# Patient Record
(demographics unavailable — no encounter records)

---

## 2017-10-20 NOTE — ED
Terra WALSH Alfonso, scribed for Sarahy Guerrero MD on 10/20/17 at 1228 .





Back Pain





- HPI Summary


HPI Summary: 


 This patient is a 43 year old F BIBA to [facility] with a chief complaint of 

constant acute on chronic shooting neck, back, and leg pain worse since 

getting up today. She states my legs gave out and I fell onto the couch. She 

was taking klonopin 1 mg TID, Percocet 10 mg QID, gabapentin 400 mg BID, and 

oxycodone 10 mg BID and recently completed these prescriptions. The patient 

rates the pain 10/10 in severity. Symptoms alleviated by nothing. Patient 

reports cough, and bilateral LE numbness. Patient denies recent falls, recent 

trauma, fever, and chills. She reports her significant other in [place] committed 

suicide 3 weeks ago.





- History of Current Complaint


Chief Complaint: EDBackInjuryPain


Stated Complaint: MHE/BACK PAIN


Time Seen by Provider: 10/20/17 11:52


Hx Obtained From: Patient


Onset/Duration: Gradual Onset, Still Present, Worse Since - getting up today.

, Other - acute on chronic


Onset/Duration: Still Present


Timing: Constant


Back Pain Location: Is Discrete @ - neck, back, and leg pain


Severity Currently: Severe


Pain Intensity: 10


Pain Scale Used: 0-10 Numeric


Character: Sharp - shooting


Alleviating Symptom(s): Nothing


Associated Signs And Symptoms: Positive: Other - Patient reports cough, and 

bilateral LE numbness. Patient denies recent falls, recent trauma, fever, and 

chills.





- Allergies/Home Medications


Allergies/Adverse Reactions: 


 Allergies











Allergy/AdvReac Type Severity Reaction Status Date / Time


 


Pregabalin [From Lyrica] Allergy  Anaphylatic Verified 10/20/17 11:59





   Shock  











Home Medications: 


 Home Medications





Albuterol HFA INHALER* [Ventolin HFA Inhaler*] 2 puff INH Q4H PRN 10/20/17 [

History Confirmed 10/20/17]


Clonazepam [Klonopin] 1 mg PO TID 10/20/17 [History Confirmed 10/20/17]


DULoxetine DR CAP* [Cymbalta CAP*] 60 mg PO DAILY 10/20/17 [History Confirmed 10

/20/17]


Folic Acid TAB* [Folvite TAB*] 1 mg PO DAILY 10/20/17 [History Confirmed 10/20/

17]


Gabapentin CAP(*) [Neurontin 400 mg CAP(*)] 400 mg PO TID 10/20/17 [History 

Confirmed 10/20/17]


Magnesium 500 mg PO DAILY 10/20/17 [History Confirmed 10/20/17]


Multiple Vitamins W/ Minerals [Multivitamin Adults] 1 tab PO DAILY 10/20/17 [

History Confirmed 10/20/17]


Thiamine HCl [Thiamine] 100 mg PO DAILY 10/20/17 [History Confirmed 10/20/17]


Thiamine HCl [Vitamin B-1] 100 mg PO DAILY 10/20/17 [History Confirmed 10/20/17]


hydrOXYzine HCL TAB* [Atarax 25 MG TAB*] 25 mg PO BID 10/20/17 [History 

Confirmed 10/20/17]


oxyCODONE SR TAB(*) [Oxycontin 10 mg (*)] 10 mg PO Q12H PRN 10/20/17 [History 

Confirmed 10/20/17]


oxyCODONE TAB* [Roxycodone TAB 5 mg*] 10 mg PO Q4H PRN 10/20/17 [History 

Confirmed 10/20/17]


traZODone TAB* [Desyrel TAB*] 100 mg PO BEDTIME 10/20/17 [History Confirmed 10/

20/17]











PMH/Surg Hx/FS Hx/Imm Hx


Endocrine/Hematology History: 


   Denies: Hx Diabetes


Cardiovascular History: Reports: Hx Hypertension - TAKES MEDICATION


   Denies: Hx Pacemaker/ICD


Musculoskeletal History: Reports: Hx Back Problems


Sensory History: 


   Denies: Hx Hearing Aid


Psychiatric History: Reports: Hx Panic Disorder - ANXIETY





- Surgical History


Surgery Procedure, Year, and Place:  & LSP SURGERY X2.  


Infectious Disease History: No


Infectious Disease History: 


   Denies: Traveled Outside the [place] in Last 30 Days





- Family History


Known Family History: 


   Negative: Diabetes





- Social History


Occupation: Unemployed, Disabled


Lives: With Family - Sister


Alcohol Use: Occasionally


Alcohol Amount: "once a month", hx of alcohol abuse


Substance Use Type: Reports: None


Smoking Status (MU): Heavy Every Day Tobacco Smoker





Review of Systems


Negative: Fever, Chills


Positive: Cough


Positive: Other - constant acute on chronic shooting neck, back, and leg pain; 

negative recent falls, recent trauma


Neurological: Other - bilateral LE numbness


All Other Systems Reviewed And Are Negative: Yes





Physical Exam





- Summary


Physical Exam Summary: 





General: Well appearing, no pain distress.


Skin: Warm, Skin Color Reflects Adequate Perfusion, Dry


Eyes: EOMI, JEN


ENT: Pharynx normal, TMs normal


Neck: Supple, nontender


Respiratory: CTA, breath sounds present, no rhonchi, no wheezes, no rales


Cardiovascular: RRR, no murmur, no rub, no gallop


Abdomen: Soft, nontender, Non-distended, no guarding, no rebound


Bowel: Present


Musculoskeletal: PHYLLIS. No edema. Diffuse pain at baseline and increased pain in 

left and right thigh. Normal DTR. Normal UE strength. 4/5 strength at hip 

flexors which she explains is her normal.


Neuro: Sensory/motor intact at baseline. Cannot do dorsal flexion of both toes 

which she explains is her baseline. A&Ox3, CN intact 2-12


Psych: Affect/mood appropriate





Triage Information Reviewed: Yes


Vital Signs On Initial Exam: 


 Initial Vitals











Temp Pulse Resp BP Pulse Ox


 


 99.1 F   87   22   157/97   97 


 


 10/20/17 11:54  10/20/17 11:54  10/20/17 11:54  10/20/17 11:54  10/20/17 11:54











Vital Signs Reviewed: Yes





- Desi Coma Scale


Coma Scale Total: 15





Diagnostics





- Vital Signs


 Vital Signs











  Temp Pulse Resp BP Pulse Ox


 


 10/20/17 12:18    18  


 


 10/20/17 11:54  99.1 F  87  22  157/97  97














- Laboratory


Lab Statement: Any lab studies that have been ordered have been reviewed, and 

results considered in the medical decision making process.





Back Pain Course/Dx





- Course


Course Of Treatment: this is a 44 yo female who reports she is new to the area 

with extensive history of lumbar, and cervical neck pain with surgeries that 

she says she needs more surgery. She moved from [place] after her boyfriend 

committed suicide where she reports she was on long term pain meds. She has 

asked that I refill her pain meds while she attempts to get into an md here. 

She denies any opiate disorder history. her exam shows no trauma (and pt denies 

any trauma). She has diffuse spinal tenderness with normal dtr's throughout. 

she deneis ivda, or diabetes or cancer hx, her strength and sensation in her 

upper exts is normal. In her lower ext she reports she has long standing 

neuropathy with decreased sensation and that she walks with a cane because of 

this. She is unable to dorsiflex both great toes and has decreased hip flexion 

but reports this is her norm





- Diagnoses


Provider Diagnoses: 


 Chronic pain








Discharge





- Discharge Plan


Condition: Stable


Disposition: HOME


Prescriptions: 


Gabapentin CAP(*) [Neurontin 300 CAP(*)] 400 mg PO BID #6 cap


Oxycodone TAB(NF) [Oxycodone HCl 10 MG] 10 mg PO BID PRN #6 tab MDD 2


 PRN Reason: Pain


clonazePAM TAB(*) [KlonoPIN TAB(*)] 1 mg PO TID PRN #9 tab MDD 3


 PRN Reason: Pain


oxyCODONE/Acetamin 10/325(NF) [Percocet 10/325 (NF)] 1 tab PO Q6HR PRN #9 tab 

MDD 4


 PRN Reason: Pain


Patient Education Materials:  Chronic Pain (ED)


Referrals: 


Rosita Silva MD [Primary Care Provider] - 3 Days


Additional Instructions: 


RETURN TO THE EMERGENCY DEPARTMENT FOR CHANGING OR WORSENING SYMPTOMS.





The documentation as recorded by the Terra jolly Alfonso accurately reflects 

the service I personally performed and the decisions made by me, Sarahy Guerrero MD.

## 2017-11-13 NOTE — ED
Alison AWLSH Edward, scribed for Patsy Kaplan MD on 17 at 0251 .





Psychiatric Complaint





- HPI Summary


HPI Summary: 





45 y/o female BIBA c/o depression and anxiety. Pt's children called the 

ambulance stating that the pt was hallucinating; however, the pt denies 

hallucinations. The symptoms are not aggravated or alleviated by anything. Pt 

recently moved from [place]. PMHx anxiety, ADD, depression, PTSD. Sx 2 

neck surgeries. Associated sx: back pain. 





- History Of Current Complaint


Hx Obtained From: Patient


Onset/Duration: Still Present


Character: Depressed, Anxious


Aggravating Factor(s): Nothing


Alleviating Factor(s): Nothing


Associated Signs And Symptoms: Positive: Hallucinating





- Allergies/Home Medications


Allergies/Adverse Reactions: 


 Allergies











Allergy/AdvReac Type Severity Reaction Status Date / Time


 


Pregabalin [From Lyrica] Allergy  Anaphylatic Verified 10/20/17 11:59





   Shock  














PMH/Surg Hx/FS Hx/Imm Hx


Previously Healthy: No


Endocrine/Hematology History: 


   Denies: Hx Diabetes


Cardiovascular History: Reports: Hx Hypertension - TAKES MEDICATION


   Denies: Hx Pacemaker/ICD


Musculoskeletal History: Reports: Hx Back Problems


Sensory History: 


   Denies: Hx Hearing Aid


Psychiatric History: Reports: Hx Panic Disorder - ANXIETY





- Surgical History


Surgery Procedure, Year, and Place:  & LSP SURGERY X2.  





- Family History


Known Family History: 


   Negative: Diabetes





- Social History


Alcohol Use: Occasionally


Alcohol Amount: "once a month", hx of alcohol abuse


Hx Substance Use: No


Substance Use Type: Reports: None


Hx Tobacco Use: Yes


Smoking Status (MU): Heavy Every Day Tobacco Smoker





Review of Systems


Constitutional: Negative


Eyes: Negative


ENT: Negative


Cardiovascular: Negative


Respiratory: Negative


Gastrointestinal: Negative


Genitourinary: Negative


Positive: Arthralgia - Back pain


Skin: Negative


Neurological: Negative


Positive: Anxious, Depressed


All Other Systems Reviewed And Are Negative: No





Physical Exam





- Summary


Physical Exam Summary: 





Appearance: Alert, conversive, nontoxic appearing, tearful.


Skin: Warm, dry, no mottling, no rashes, no contusions


HEENT: EOMI, PERRL, moist mucous membranes


Neck: No masses on the neck, supple


Respiratory: Clear to auscultation, breath sounds present, no rales, no rhonchi

, no wheezes


Cardiovascular: RRR, pulses are symmetrical in both lower and upper extremities


Abdomen: Soft, non-tender


Bowel Sounds: Present


Musculoskeletal: No CVA tenderness, no obvious deformity, moving all 

extremities in a grossly normal manner


Neurological: A&Ox3, CN II-XII Intact, moving all extremities symmetrically


Psychiatric: Tearful, anxious, difficulty making eye contact. Poor insight and 

poor judgment.


Triage Information Reviewed: Yes


Vital Signs On Initial Exam: 


 Initial Vitals











Temp Pulse Resp BP Pulse Ox


 


 99.2 F   105   20   132/86   99 


 


 17 02:58  17 02:58  17 02:58  17 02:58  17 02:58











Vital Signs Reviewed: Yes





Diagnostics





- Vital Signs


 Vital Signs











  Temp Pulse Resp BP Pulse Ox


 


 17 04:10    18  


 


 17 02:58  99.2 F  105  20  132/86  99














- Laboratory


Lab Results: 


 Lab Results











  17 Range/Units





  06:39 


 


WBC  11.0 H  (3.5-10.8)  10^3/ul


 


RBC  4.40  (4.0-5.4)  10^6/ul


 


Hgb  12.1  (12.0-16.0)  g/dl


 


Hct  37  (35-47)  %


 


MCV  83  (80-97)  fL


 


MCH  28  (27-31)  pg


 


MCHC  33  (31-36)  g/dl


 


RDW  20 H  (10.5-15)  %


 


Plt Count  236  (150-450)  10^3/ul


 


MPV  8  (7.4-10.4)  um3


 


Neut % (Auto)  64.3  (38-83)  %


 


Lymph % (Auto)  26.2  (25-47)  %


 


Mono % (Auto)  8.3  (1-9)  %


 


Eos % (Auto)  0.2  (0-6)  %


 


Baso % (Auto)  1.0  (0-2)  %


 


Absolute Neuts (auto)  7.1  (1.5-7.7)  10^3/ul


 


Absolute Lymphs (auto)  2.9  (1.0-4.8)  10^3/ul


 


Absolute Monos (auto)  0.9 H  (0-0.8)  10^3/ul


 


Absolute Eos (auto)  0  (0-0.6)  10^3/ul


 


Absolute Basos (auto)  0.1  (0-0.2)  10^3/ul


 


Absolute Nucleated RBC  0  10^3/ul


 


Nucleated RBC %  0  











Result Diagrams: 


 17 06:39





Lab Statement: Any lab studies that have been ordered have been reviewed, and 

results considered in the medical decision making process.





Course/Dx





- Differential Dx/Clinical Impression


Provider Diagnosis: 


 Anxiety








Discharge





- Discharge Plan


Condition: Stable


Disposition: OTHER


Discharge Disposition Comment: signed out to Dr. Durham


Referrals: 


Rosita Silva MD [Primary Care Provider] - 





The documentation as recorded by the Alison jolly Edward accurately reflects the 

service I personally performed and the decisions made by Eusebio hernandez Norma, MD.

## 2017-11-13 NOTE — ED
Jesús WALSH Angela, scribed for Timothy Durham MD on 11/13/17 at 1548 .





Progress





- Progress Note


Progress Note: 





This pt was signed out by Dr. Kaplan, pending disposition, awaiting MHE.





Pt was seen and evaluated by Dr. Simeon, he recommends discharge. Pt will be 

discharged with outpatient follow up from counseling and social work.





Pt will be discharged to home in stable condition with a diagnosis of substance 

abuse and depression. 





Course/Dx





- Diagnoses


Provider Diagnoses: 


 Substance abuse, Depression








The documentation as recorded by the Jesús jolly Angela accurately reflects 

the service I personally performed and the decisions made by me, Timothy Durham MD.

## 2017-11-24 NOTE — ED
Psychiatric Complaint





- HPI Summary


HPI Summary: 


44F presents with increasing depression.   She has history of chronic pain and 

weakness that is unchanged.  She just moved from [place].  She has history of 

chronic pain that takes pain medication for but has ran out and does not have a 

primary here.  She is here but she wants to get linked with someone from mental 

health.  She states has history of PTSD and depression.  Her boyfriend 

committed suicide in [place].  She denies any si/hi.








- History Of Current Complaint


Chief Complaint: EDMentalHealth


Time Seen by Provider: 17 21:13





- Allergies/Home Medications


Allergies/Adverse Reactions: 


 Allergies











Allergy/AdvReac Type Severity Reaction Status Date / Time


 


Pregabalin [From Lyrica] Allergy  Anaphylatic Verified 10/20/17 11:59





   Shock  














PMH/Surg Hx/FS Hx/Imm Hx


Endocrine/Hematology History: 


   Denies: Hx Diabetes


Cardiovascular History: Reports: Hx Hypertension - TAKES MEDICATION


   Denies: Hx Pacemaker/ICD


Musculoskeletal History: Reports: Hx Back Problems


Sensory History: 


   Denies: Hx Hearing Aid


Psychiatric History: Reports: Hx Panic Disorder - ANXIETY


   Denies: Hx of Violent Episodes Against Others





- Surgical History


Surgery Procedure, Year, and Place:  & LSP SURGERY X2.  





- Immunization History


Date of Tetanus Vaccine: utd


Date of Influenza Vaccine: none


Infectious Disease History: No


Infectious Disease History: 


   Denies: Traveled Outside the [place] in Last 30 Days





- Family History


Known Family History: 


   Negative: Diabetes





- Social History


Alcohol Use: Weekly


Alcohol Amount: 1/5 a week


Hx Substance Use: No


Substance Use Type: Reports: Marijuana


Substance Use Comment - Amount & Last Used: used a couple weeks ago


Hx Tobacco Use: Yes


Smoking Status (MU): Heavy Every Day Tobacco Smoker





Review of Systems


Negative: Fever


Negative: Chest Pain


Negative: Shortness Of Breath


Positive: Weakness


Positive: Depressed


All Other Systems Reviewed And Are Negative: Yes





Physical Exam


Triage Information Reviewed: Yes


Vital Signs On Initial Exam: 


 Initial Vitals











Temp Pulse Resp BP Pulse Ox


 


 97 F   100   16   92/74   98 


 


 17 19:33  17 19:33  17 19:33  17 19:33  17 19:33











Vital Signs Reviewed: Yes


Appearance: Positive: Well-Appearing


Skin: Positive: Warm, Dry


Head/Face: Positive: Normal Head/Face Inspection


Eyes: Positive: Normal, EOMI, Conjunctiva Clear


ENT: Positive: Normal ENT inspection, Pharynx normal, TMs normal


Respiratory/Lung Sounds: Positive: Clear to Auscultation, Breath Sounds Present


Cardiovascular: Positive: Normal, RRR


Abdomen Description: Positive: Nontender, Soft


Bowel Sounds: Positive: Present


Musculoskeletal: Positive: Normal


Neurological: Positive: Normal


Psychiatric: Positive: Normal





- Desi Coma Scale


Coma Scale Total: 15





Diagnostics





- Vital Signs


 Vital Signs











  Temp Pulse Resp BP Pulse Ox


 


 17 19:33  97 F  100  16  92/74  98














- Laboratory


Result Diagrams: 


 17 22:00





 17 22:00


Lab Statement: Any lab studies that have been ordered have been reviewed, and 

results considered in the medical decision making process.





Course/Dx





- Course


Course Of Treatment: 44F presents with increasing depression.   She has history 

of chronic pain and weakness that is unchanged.  She just moved from [place].  She 

has history of chronic pain that takes pain medication for but has ran out and 

does not have a primary here.  She is here but she wants to get linked with 

someone from mental health.  She states has history of PTSD and depression.  

Her boyfriend committed suicide in [place].  She denies any si/hi.  normal PE. is 

medically clear for MHE. signed out to dr durham pending MHE





- Differential Dx/Clinical Impression


Differential Diagnosis/HQI/PQRI: Positive: Anxiety, Depression, Other - chronic 

pain


Provider Diagnosis: 


 Depression








Discharge





- Discharge Plan


Condition: Stable


Disposition: OTHER


Discharge Disposition Comment: signed out to Dr Durham pending MHE


Referrals: 


No Primary Care Phys,NOPCP [Primary Care Provider] -
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
## 2017-11-27 NOTE — PN
Subjective





- Subjective


Service Type: 42756 Hosp care 15 min low complexity


Subjective: 





Patient has been minimally participatory on the milieu, coming out for meals 

but not attending groups or allowing therapists from Rec, PT or OT to work with 

her.  She is highly somatic and fixated on pain medications.  She offers the 

[facility] in [place] as a place where she has received opioids, so that 

we might confirm her prescription history.  The ISTOP website indicates that 

this was prescribed by our ED provider, Dr. Andrade, on October 20th.  

Interestingly, according to the [facility] pharmacist, the patient filled the 

oxycodone and oxycontin, but not the clonazepam and gabapentin.  The patient is 

currently on diclofenac.  She's refusing Tylenol because of fatty liver, but 

this did not prevent her from taking oxycontin, which contains tylenol.  

Patient is allowing  to get her insurance switched to [place].  She continues to 

deny SI.  She still cannot recall the names of any of her providers or 

pharmacies in [place].





Objective





- Appearance


Appearance: Well Developed/Nourished


Dysmorphic Features: No


Hygiene: Normal


Grooming: Fairly Well Kept





- Behavior


Psychomotor Activities: Normal


Exhibits Abnormal Movement: No





- Attitude and Relatedness


Attitude and Relatedness: Cooperative


Eye Contact: Fair





- Speech


Quality: Unpressured


Latencies: Normal


Quantity: Appropriate





- Mood


Patient's Decription of Mood: "Okay"





- Affect


Observed Affect: Fair


Affect Consistent with: Dysphoria





- Thought Process


Patient's Thought Process: Coherent


Thought Content: No Passive Death Wish, No Suicidal Planning, No Homicidal 

Ideation, No Paranoid Ideation





- Sensorium


Experiencing Hallucinations: No, Sensorium is Clear


Type of Hallucinations: Visual: No, Auditory: No, Command: No





- Level of Consciousness


Level of Consciousness: Alert


Orientation: Yes Intact, Yes Orientated to Time, Yes Orientated to Place, Yes 

Orientated to Person





- Impulse Control


Impulse Control: Tenuous





- Insight and Judgement


Insight and Judgement: Fair





- Group Participation


Particating in Group Activities: No





- Medication Management


Medication Management Adherence: Yes





Assessment





- Assessment


Merits Inpatient Hospitalization: Consolidate Improvements, Pending Safe DC Plan


Inpatient DSM-IV Dx: MDD, recurrent, severe without psychotic features


Clinical Impression: 





44 y.o. single, white female with a history of alcohol use disorder and 

recurrent depression, who is also suffering from bereavement from the recent 

suicidal death of her boyfriend of 17 years, who presented to the ED on a 

voluntary basis seeking admission to the unit for increasing depression, 

increasing alcohol consumption, somatic pain and inability to care for herself 

in the outpatient environment.





Plan





- Plan


Treatment Plan: 


Name: REA SHERWOOD                        


YOB: 1973                        


[id number]


[id number]








We have resumed outpatient medications, including duloxetine 60mg PO qday and 

trazodone 100mg PO qhs.  For pain, we are providing gabapentin 400mg PO TID, 

prn tylenol and scheduled diclofenac 50mg PO BID.  Will increase diclofenac to 

TID dosing.  Patient encouraged to be more participatory on the unit.  Continue 

inpatient level services.


Continued Medication Management: Continue Outpt Medication


Medications: 


 Current Medications





Acetaminophen (Tylenol Tab*)  650 mg PO Q4H PRN


   PRN Reason: PAIN or TEMP > 101 F


Al Hydrox/Mg Hydrox/Simethicone (Maalox Plus*)  30 ml PO Q4H PRN


   PRN Reason: INDIGESTION


Albuterol (Ventolin Hfa Inhaler*)  2 puff INH Q4H PRN


   PRN Reason: WHEEZING


Clonidine HCl (Catapres Tab*)  0.1 mg PO TID PRN


   PRN Reason: ANXIETY


   Last Admin: 11/27/17 13:49 Dose:  0.1 mg


Device (Nicotine Mouth Piece*)  1 each INH .CARTRIDGE [facility]


   Last Admin: 11/25/17 08:25 Dose:  1 each


Diclofenac Sodium (Voltaren Ec Tab*)  50 mg PO BID [facility]


   Last Admin: 11/27/17 08:24 Dose:  50 mg


Duloxetine HCl (Cymbalta Cap*)  60 mg PO DAILY [facility]


   Last Admin: 11/27/17 08:22 Dose:  60 mg


Folic Acid (Folvite Tab*)  1 mg PO DAILY [facility]


   Last Admin: 11/27/17 08:23 Dose:  1 mg


Gabapentin (Neurontin Cap(*))  400 mg PO TID [facility]


   Last Admin: 11/27/17 13:48 Dose:  400 mg


Hydroxyzine HCl (Atarax Tab*)  25 mg PO BID [facility]


   Last Admin: 11/27/17 08:23 Dose:  25 mg


Magnesium Oxide (Magox 400 Tab*)  400 mg PO DAILY [facility]


Multivitamins/Minerals (Theragran/Minerals Tab*)  1 tab PO DAILY [facility]


   Last Admin: 11/27/17 08:22 Dose:  1 tab


Nicotine (Nicotine Inhaler*)  10 mg INH Q2H PRN


   PRN Reason: CRAVING


   Last Admin: 11/27/17 13:48 Dose:  10 mg


Nicotine Polacrilex (Nicotine Gum*)  2 mg PO Q2H PRN


   PRN Reason: CRAVING


Thiamine HCl (Vitamin B-1 Tab*)  100 mg PO DAILY [facility]


   Last Admin: 11/27/17 08:23 Dose:  100 mg


Trazodone HCl (Desyrel Tab*)  100 mg PO BEDTIME [facility]


   Last Admin: 11/26/17 20:41 Dose:  100 mg











- Discharge Plan


Discharge Plan: Inpatient Hospitalization

## 2017-11-28 NOTE — PN
Subjective





- Subjective


Service Type: 54119 Hosp care 15 min low complexity


Subjective: 





Rea continues to complain of back/neck pain and anxiety, but denies SI.  

She has gone to some group programming today, but has left groups early 

complaining of somatic distress.  She is requesting increases in gabapentin and 

hydroxyzine.  She offers the name of a prior primary care provider in [place], 

named Dr. Rosita iSlva.  I called Dr. Silva's office and received a voicemail 

from that clinician in return, indicating that Rea's case was terminated in 

October of 2015 due to several missed appointments.  Dr. Silva made it clear 

that she was not willing to reassume care of Ms. Sherwood due to the missed 

appointments.  





Objective





- Appearance


Appearance: Well Developed/Nourished


Dysmorphic Features: No


Hygiene: Normal


Grooming: Fairly Well Kept





- Behavior


Psychomotor Activities: Normal


Exhibits Abnormal Movement: No





- Attitude and Relatedness


Attitude and Relatedness: Cooperative


Eye Contact: Fair





- Speech


Quality: Unpressured


Latencies: Normal


Quantity: Appropriate





- Mood


Patient's Decription of Mood: "Anxious"





- Affect


Observed Affect: Fair


Affect Consistent with: Euthymia





- Thought Process


Patient's Thought Process: Coherent


Thought Content: No Passive Death Wish, No Suicidal Planning, No Homicidal 

Ideation, No Paranoid Ideation





- Sensorium


Experiencing Hallucinations: No, Sensorium is Clear


Type of Hallucinations: Visual: No, Auditory: No, Command: No





- Level of Consciousness


Level of Consciousness: Alert


Orientation: Yes Intact, Yes Orientated to Time, Yes Orientated to Place, Yes 

Orientated to Person





- Impulse Control


Impulse Control: Tenuous





- Insight and Judgement


Insight and Judgement: Fair





- Group Participation


Particating in Group Activities: No





- Medication Management


Medication Management Adherence: Yes





Assessment





- Assessment


Merits Inpatient Hospitalization: Consolidate Improvements, For Discharge 

Planning


Inpatient DSM-IV Dx: MDD, recurrent, severe without psychotic features


Clinical Impression: 





44 y.o. single, white female with a history of alcohol use disorder and 

recurrent depression, who is also suffering from bereavement from the recent 

suicidal death of her boyfriend of 17 years, who presented to the ED on a 

voluntary basis seeking admission to the unit for increasing depression, 

increasing alcohol consumption, somatic pain and inability to care for herself 

in the outpatient environment.





Plan





- Plan


Treatment Plan: 


Name: REA SHERWOOD                        


YOB: 1973                        


[id number]


[id number]








We have resumed outpatient medications, including duloxetine 60mg PO qday, 

hydroxyzine 25mg PO BID and trazodone 100mg PO qhs.  For pain, we are providing 

gabapentin 400mg PO TID, prn tylenol and scheduled diclofenac 50mg PO BID.  

Will increase gabapentin to 600mg PO TID and hydroxyzine to 50mg PO BID.  

Patient encouraged to be more participatory on the unit.  Continue inpatient 

level services while we try to connect her with local outpatient care.


Continued Medication Management: Start Medication


Medications: 


 Current Medications





Acetaminophen (Tylenol Tab*)  650 mg PO Q4H PRN


   PRN Reason: PAIN or TEMP > 101 F


Al Hydrox/Mg Hydrox/Simethicone (Maalox Plus*)  30 ml PO Q4H PRN


   PRN Reason: INDIGESTION


Albuterol (Ventolin Hfa Inhaler*)  2 puff INH Q4H PRN


   PRN Reason: WHEEZING


Clonidine HCl (Catapres Tab*)  0.1 mg PO TID PRN


   PRN Reason: ANXIETY


   Last Admin: 11/28/17 13:24 Dose:  0.1 mg


Device (Nicotine Mouth Piece*)  1 each INH .CARTRIDGE [facility]


   Last Admin: 11/25/17 08:25 Dose:  1 each


Diclofenac Sodium (Voltaren Ec Tab*)  50 mg PO TID [facility]


   Last Admin: 11/28/17 13:25 Dose:  50 mg


Duloxetine HCl (Cymbalta Cap*)  60 mg PO DAILY [facility]


   Last Admin: 11/28/17 08:17 Dose:  60 mg


Folic Acid (Folvite Tab*)  1 mg PO DAILY [facility]


   Last Admin: 11/28/17 08:17 Dose:  1 mg


Lidocaine (Lidoderm 5% Patch*)  1 patch TRANSDERM DAILY [facility]


   Last Admin: 11/28/17 08:20 Dose:  1 patch


Magnesium Oxide (Magox 400 Tab*)  400 mg PO DAILY [facility]


   Last Admin: 11/28/17 08:18 Dose:  400 mg


Multivitamins/Minerals (Theragran/Minerals Tab*)  1 tab PO DAILY [facility]


   Last Admin: 11/28/17 08:18 Dose:  1 tab


Nicotine (Nicotine Inhaler*)  10 mg INH Q2H PRN


   PRN Reason: CRAVING


   Last Admin: 11/27/17 13:48 Dose:  10 mg


Nicotine Polacrilex (Nicotine Gum*)  2 mg PO Q2H PRN


   PRN Reason: CRAVING


   Last Admin: 11/28/17 07:03 Dose:  2 mg


Pharmacy Profile Note (Lidocaine Patch Remove*)  1 note N/A 2[address] [facility]


   Last Admin: 11/28/17 06:15 Dose:  1 note


Thiamine HCl (Vitamin B-1 Tab*)  100 mg PO DAILY [facility]


   Last Admin: 11/28/17 08:17 Dose:  100 mg


Trazodone HCl (Desyrel Tab*)  100 mg PO BEDTIME [facility]


   Last Admin: 11/27/17 21:08 Dose:  100 mg











- Discharge Plan


Discharge Plan: Inpatient Hospitalization

## 2017-11-28 NOTE — PN
MHU: Group Therapy Note





- Service Type


Service Type: 24021 Group Psychotherapy - Cognitive Behavioral Group Therapy (

CBT):Patient was attentive and participatory in CBT programming this morning, 

and remained in good behavioral control.  Patient expressed positive insights 

regarding relevant treatment interventions and goals.

## 2017-11-29 NOTE — PN
Subjective





- Subjective


Service Type: 80271 Hosp care 15 min low complexity


Subjective: 





Patient upset that more is not being done for her pain.  Remains highly 

somatic.  Complaining of new-onset bilateral ankle edema that she attributes to 

diclofenac.  Minimal participation in group programming.  Still denies SI but 

remains depressed.





Objective





- Appearance


Appearance: Well Developed/Nourished


Dysmorphic Features: No


Hygiene: Normal


Grooming: Fairly Well Kept





- Behavior


Psychomotor Activities: Abnormal-Decreased


Exhibits Abnormal Movement: No





- Attitude and Relatedness


Attitude and Relatedness: Cooperative


Eye Contact: Fair





- Speech


Quality: Unpressured


Latencies: Normal


Quantity: Appropriate





- Mood


Patient's Decription of Mood: "Anxious"





- Affect


Observed Affect: Tense


Affect Consistent with: Dysphoria





- Thought Process


Patient's Thought Process: Coherent


Thought Content: No Passive Death Wish, No Suicidal Planning, No Homicidal 

Ideation, No Paranoid Ideation





- Sensorium


Experiencing Hallucinations: No, Sensorium is Clear


Type of Hallucinations: Visual: No, Auditory: No, Command: No





- Level of Consciousness


Level of Consciousness: Alert


Orientation: Yes Intact, Yes Orientated to Time, Yes Orientated to Place, Yes 

Orientated to Person





- Impulse Control


Impulse Control: Tenuous





- Insight and Judgement


Insight and Judgement: Fair





- Group Participation


Particating in Group Activities: Yes





- Medication Management


Medication Management Adherence: Yes





Assessment





- Assessment


Merits Inpatient Hospitalization: Consolidate Improvements, Pending Safe DC Plan


Inpatient DSM-IV Dx: MDD, recurrent, severe without psychotic features


Clinical Impression: 





44 y.o. single, white female with a history of alcohol use disorder and 

recurrent depression, who is also suffering from bereavement from the recent 

suicidal death of her boyfriend of 17 years, who presented to the ED on a 

voluntary basis seeking admission to the unit for increasing depression, 

increasing alcohol consumption, somatic pain and inability to care for herself 

in the outpatient environment.





Plan





- Plan


Treatment Plan: 


Name: REA SHERWOOD                        


YOB: 1973                        


[id number]


[id number]








We have resumed outpatient medications, including duloxetine 60mg PO qday, 

hydroxyzine 50mg PO BID and trazodone 100mg PO qhs.  For pain, we are providing 

gabapentin 600mg PO TID, prn tylenol and scheduled diclofenac 50mg PO TID.  

Will discontinue diclofenac due to edema and consult pain team. Patient 

encouraged to be more participatory on the unit.  Continue inpatient level 

services while we try to connect her with local outpatient care.


Continued Medication Management: Continue Outpt Medication


Medications: 


 Current Medications





Acetaminophen (Tylenol Tab*)  650 mg PO Q4H PRN


   PRN Reason: PAIN or TEMP > 101 F


Al Hydrox/Mg Hydrox/Simethicone (Maalox Plus*)  30 ml PO Q4H PRN


   PRN Reason: INDIGESTION


Albuterol (Ventolin Hfa Inhaler*)  2 puff INH Q4H PRN


   PRN Reason: WHEEZING


Clonidine HCl (Catapres Tab*)  0.1 mg PO TID PRN


   PRN Reason: ANXIETY


   Last Admin: 11/29/17 07:28 Dose:  0.1 mg


Device (Nicotine Mouth Piece*)  1 each INH .CARTRIDGE [facility]


   Last Admin: 11/25/17 08:25 Dose:  1 each


Diclofenac Sodium (Voltaren Ec Tab*)  50 mg PO TID [facility]


   Last Admin: 11/29/17 13:41 Dose:  50 mg


Duloxetine HCl (Cymbalta Cap*)  60 mg PO DAILY [facility]


   Last Admin: 11/29/17 07:28 Dose:  60 mg


Folic Acid (Folvite Tab*)  1 mg PO DAILY [facility]


   Last Admin: 11/29/17 07:28 Dose:  1 mg


Gabapentin (Neurontin Cap(*))  600 mg PO TID [facility]


   Last Admin: 11/29/17 13:42 Dose:  600 mg


Hydroxyzine HCl (Atarax Tab*)  50 mg PO BID [facility]


   Last Admin: 11/29/17 07:29 Dose:  50 mg


Lidocaine (Lidoderm 5% Patch*)  1 patch TRANSDERM DAILY [facility]


   Last Admin: 11/29/17 07:29 Dose:  1 patch


Magnesium Oxide (Magox 400 Tab*)  400 mg PO DAILY [facility]


   Last Admin: 11/29/17 07:28 Dose:  400 mg


Multivitamins/Minerals (Theragran/Minerals Tab*)  1 tab PO DAILY [facility]


   Last Admin: 11/29/17 07:28 Dose:  1 tab


Nicotine (Nicotine Inhaler*)  10 mg INH Q2H PRN


   PRN Reason: CRAVING


   Last Admin: 11/27/17 13:48 Dose:  10 mg


Nicotine Polacrilex (Nicotine Gum*)  2 mg PO Q2H PRN


   PRN Reason: CRAVING


   Last Admin: 11/29/17 06:05 Dose:  2 mg


Pharmacy Profile Note (Lidocaine Patch Remove*)  1 note N/A 2100 [facility]


   Last Admin: 11/28/17 20:53 Dose:  1 note


Thiamine HCl (Vitamin B-1 Tab*)  100 mg PO DAILY [facility]


   Last Admin: 11/29/17 07:29 Dose:  100 mg


Trazodone HCl (Desyrel Tab*)  100 mg PO BEDTIME [facility]


   Last Admin: 11/28/17 20:52 Dose:  100 mg











- Discharge Plan


Discharge Plan: Inpatient Hospitalization

## 2017-11-29 NOTE — CONS
INPATIENT PAIN CONSULTATION:

 

DATE OF CONSULT:  11/29/17

 

REQUESTING PHYSICIAN:  Dr. Simeon.

 

REASON FOR REFERRAL:  Back and neck pain.

 

HISTORY OF PRESENT ILLNESS:  Angeline Herbert is a 44-year-old female.  
According to the patient, she injured her neck and back years ago.  She has had 
2 previous back surgeries and a cervical fusion done by Dr. Garcia in 
[place].  Her back surgeries were done in [place] by another surgeon.  She 
says she has had chronic neck pain.  After she had her neck surgery 3 years ago
, she had moved to [place].  She said she saw a pain clinic in 
[place] as well as a surgeon in [place].  They did not do any surgery on 
her neck.  She was told the pain clinic told her her neck was unstable.  She 
says she has pain in her neck that radiates down her spine and into her legs.  
There are no previous records available for review.  She tells me she saw a 
primary care doctor in [place].  He was writing her for narcotic pain 
medications.  She said she took oxycodone 10 mg every 4 hours as needed as well 
as OxyContin 10 mg twice a day. She had tried other pain medications prior to 
that.  The patient's longtime partner of the past 17 years committed suicide in 
October of this year.  Since that time, she went back to drinking alcohol.  She 
moved back to the [facility] because she has a son and daughter living in the 
area.  She spent the night at the residence of her ex- boyfriend who brought 
the patient to the hospital the day after Thanksgiving.  She did not seem able 
to manage herself.  The patient does tell me she has a history of alcoholism in 
the past and has gone to  but that she had cut back on her drinking and 
really was only having 1 or 2 drinks every few weeks.  That is until the death 
of her longtime boyfriend.

 

PAST MEDICAL HISTORY:  Significant for the 2 previous back surgeries 15 years 
ago and she also had the neck surgery 3 years ago.  She has a history of asthma 
as well.

 

CURRENT MEDICATIONS:  Include;

1.  Clonidine.

2.  Cymbalta 60 mg daily.

3.  Gabapentin 600 mg 3 times a day.

4.  She has a Lidoderm patch.

5.  She is also on a nicotine inhaler.

 

ALLERGIES:  The patient has allergies listed to LYRICA.

 

SOCIAL HISTORY:  She is a smoker.  As mentioned, she had some difficulties with 
alcohol.

 

PHYSICAL EXAM:  Vital Signs:  The patient's temperature is 98.8, blood pressure 
is 100/50, pulse is 90, respirations 18.  HEENT:  Her extraocular movements 
were intact.  Her neck has a scar in the front of her neck.  She had somewhat 
limited range of motion consistent with a previous fusion.  She was tender over 
her upper trapezius muscles.  Lung sounded clear to auscultation bilaterally.  
Heart sounds were regular.  S1 and S2 audible.  Abdomen: Soft and nontender.  
Extremities: Showed normal muscle bulk and tone.  Back:  Examined.  There was a 
scar from her previous surgery.  Neurologic:  She was awake, alert.  Muscle 
strength appeared to be 5/5 in both upper and lower extremities.  She was able 
to ambulate without assistive devices.  She had an antalgic gait.

 

ASSESSMENT:  Failed neck surgery.

 

PLAN:  Given her history of alcohol, I do not think it is reasonable to start 
her on short-acting opioids.  Our choice is really between a long-acting 
morphine or methadone.  Given her history of alcohol abuse in past, methadone 
is the wiser choice.  I would recommend starting her on methadone 5 mg 3 times 
a day.  We could increase this if needed.  She certainly should not be taking 
more than 10 mg twice a day at present until we get more records.  I am going 
to go ahead and write for the methadone.

 

Thank you for the consult.

 

 [id number]/GAMALIEL #: [id number]

GELACIO

## 2017-11-30 NOTE — PN
MHU: Group Therapy Note





- Service Type


Service Type: 02183 Group Psychotherapy - Cognitive Behavioral Group Therapy (

CBT):Patient was attentive and participatory in CBT programming this morning, 

and remained in good behavioral control.  Patient expressed positive insights 

regarding relevant treatment interventions and goals.

## 2017-11-30 NOTE — PN
Subjective





- Subjective


Service Type: 28628 Hosp care 15 min low complexity


Subjective: 





Rea appears to be in better spirits today.  She was started on low dose 

methadone by pain specialist, Dr. Avila, yesterday, and I am told he was 

willing to see her on an outpatient basis.  Consequently, she endorses less 

pain symptoms and has been observed to be more participatory in groups.  She 

states that her ex-boyfriend would be willing to pick her up from the hospital 

tomorrow and allow her to stay with him until she can go to [facility] for emergency 

shelter.  She continues to deny SI.





Objective





- Appearance


Appearance: Well Developed/Nourished


Dysmorphic Features: No


Hygiene: Normal


Grooming: Well Kept





- Behavior


Psychomotor Activities: Normal


Exhibits Abnormal Movement: No





- Attitude and Relatedness


Attitude and Relatedness: Cooperative


Eye Contact: Good





- Speech


Quality: Unpressured


Latencies: Normal


Quantity: Appropriate





- Mood


Patient's Decription of Mood: "Good"





- Affect


Observed Affect: Good


Affect Consistent with: Euthymia





- Thought Process


Patient's Thought Process: Coherent


Thought Content: No Passive Death Wish, No Suicidal Planning, No Homicidal 

Ideation, No Paranoid Ideation





- Sensorium


Experiencing Hallucinations: No, Sensorium is Clear


Type of Hallucinations: Visual: No, Auditory: No, Command: No





- Level of Consciousness


Level of Consciousness: Alert


Orientation: Yes Intact, Yes Orientated to Time, Yes Orientated to Place, Yes 

Orientated to Person





- Impulse Control


Impulse Control: Tenuous





- Insight and Judgement


Insight and Judgement: Fair





- Group Participation


Particating in Group Activities: Yes





- Medication Management


Medication Management Adherence: Yes





Assessment





- Assessment


Merits Inpatient Hospitalization: Consolidate Improvements, Pending Safe DC Plan


Inpatient DSM-IV Dx: MDD, recurrent, severe without psychotic features


Clinical Impression: 





44 y.o. single, white female with a history of alcohol use disorder and 

recurrent depression, who is also suffering from bereavement from the recent 

suicidal death of her boyfriend of 17 years, who presented to the ED on a 

voluntary basis seeking admission to the unit for increasing depression, 

increasing alcohol consumption, somatic pain and inability to care for herself 

in the outpatient environment.





Plan





- Plan


Treatment Plan: 


Name: REA SHERWOOD                        


YOB: 1973                        


[id number]


[id number]








We are grateful to Dr. Avila for his recommendations regarding pain treatment

, and Rea already seems to be benefitting.  Her mood has been improving on 

duloxetine 60mg PO qday, hydroxyzine 50mg PO BID and trazodone 100mg PO qhs.  

She is also taking gabapentin 600mg PO TID and prn clonidine.  Will likely d/c 

tomorrow to her ex-boyfriend's, pending [facility] emergency housing.  Patient [facility] 

[facility].


Continued Medication Management: Continue Outpt Medication


Medications: 


 Current Medications





Acetaminophen (Tylenol Tab*)  650 mg PO Q4H PRN


   PRN Reason: PAIN or TEMP > 101 F


Al Hydrox/Mg Hydrox/Simethicone (Maalox Plus*)  30 ml PO Q4H PRN


   PRN Reason: INDIGESTION


Albuterol (Ventolin Hfa Inhaler*)  2 puff INH Q4H PRN


   PRN Reason: WHEEZING


Clonidine HCl (Catapres Tab*)  0.1 mg PO TID PRN


   PRN Reason: ANXIETY


   Last Admin: 11/30/17 12:23 Dose:  0.1 mg


Device (Nicotine Mouth Piece*)  1 each INH .CARTRIDGE [facility]


   Last Admin: 11/25/17 08:25 Dose:  1 each


Duloxetine HCl (Cymbalta Cap*)  60 mg PO DAILY [facility]


   Last Admin: 11/30/17 07:58 Dose:  60 mg


Folic Acid (Folvite Tab*)  1 mg PO DAILY [facility]


   Last Admin: 11/30/17 07:57 Dose:  1 mg


Gabapentin (Neurontin Cap(*))  600 mg PO TID [facility]


   Last Admin: 11/30/17 07:58 Dose:  600 mg


Hydroxyzine HCl (Atarax Tab*)  50 mg PO BID [facility]


   Last Admin: 11/30/17 07:58 Dose:  50 mg


Lidocaine (Lidoderm 5% Patch*)  1 patch TRANSDERM DAILY [facility]


   Last Admin: 11/30/17 09:01 Dose:  1 patch


Magnesium Oxide (Magox 400 Tab*)  400 mg PO DAILY [facility]


   Last Admin: 11/30/17 07:57 Dose:  400 mg


Methadone HCl (Dolophine Tab*)  5 mg PO Q8H [facility]


   Last Admin: 11/30/17 08:02 Dose:  Not Given


Multivitamins/Minerals (Theragran/Minerals Tab*)  1 tab PO DAILY [facility]


   Last Admin: 11/30/17 07:57 Dose:  1 tab


Nicotine (Nicotine Inhaler*)  10 mg INH Q2H PRN


   PRN Reason: CRAVING


   Last Admin: 11/27/17 13:48 Dose:  10 mg


Nicotine Polacrilex (Nicotine Gum*)  2 mg PO Q2H PRN


   PRN Reason: CRAVING


   Last Admin: 11/30/17 08:50 Dose:  2 mg


Pharmacy Profile Note (Lidocaine Patch Remove*)  1 note N/A 2100 [facility]


   Last Admin: 11/29/17 21:49 Dose:  1 note


Thiamine HCl (Vitamin B-1 Tab*)  100 mg PO DAILY [facility]


   Last Admin: 11/30/17 07:58 Dose:  100 mg


Trazodone HCl (Desyrel Tab*)  100 mg PO BEDTIME [facility]


   Last Admin: 11/29/17 21:48 Dose:  100 mg











- Discharge Plan


Discharge Plan: Outpatient Follow Up


Outpatient Program: [facility]

## 2017-12-01 NOTE — DS
DATE OF ADMISSION:  11/25/2017.

 

DATE OF DISCHARGE:  12/01/2017.

 

DISCHARGE DIAGNOSIS:

 

AXIS I:  Major depressive disorder, recurrent, severe without psychotic features
; alcohol use disorder.

 

AXIS II:  Deferred.

 

AXIS III:  History of back and neck surgery, asthma, degenerative disk disease, 
carpal tunnel syndrome.

 

AXIS IV:  Severe, primary support and housing stressors.

 

AXIS V:  At the time of admission was 35 and at the time of discharge is 60.

 

CONDITION AT THE TIME OF DISCHARGE:  Improved.  The patient continues to deny 
suicidal ideations as she has done so throughout her hospitalization.  Her pain 
is much better controlled now that she has been placed on Methadone by the [facility] 
pain service.  She is tolerating her medications quite well and willing to 
receive outpatient therapy and medication management at [facility].  For pain issues, she will be attending the [facility].  It 
is notable that the patient is declining either inpatient or outpatient 
substance abuse rehab and is fairly minimizing about her alcohol abuse leading 
up to this hospitalization.  With that being said, she has good insight into 
her issues and we have referred her to the Department of  for 
temporary assistance until she can get her Medicaid and her social security 
switched over from [place] back to [place].  The patient's ex-boyfriend 
will be allowing her to stay at his house temporarily until she can get 
emergency shelter.  He is agreeable with the discharge planning and will be 
picking her up from the hospital.  The patient has been safe on all checks and 
she is appropriately requesting discharge. We see no justification for further 
inpatient treatment.

 

MENTAL STATUS EXAMINATION:  The patient is a middle-aged, white female who 
appears somewhat pan and older than her stated age.  She is sitting up at 
one of the tables in the milieu, appearing to be comfortable and making good 
eye contact.  She is clean and well-groomed, has a normal rate, tone, and 
volume to her speech.  Mood is euthymic with a full affect.  Thought process is 
linear and goal-directed. Though content is significant for her desire to leave 
the hospital.  She is denying suicidal or homicidal ideations.  She denies 
auditory or visual hallucinations. Insight and judgement are fair given her 
willingness to follow-up with outpatient treatment in the community.  
Cognitively, she is awake and alert with what would appear to be an average 
intellect.

 

DISCHARGE INSTRUCTIONS TO THE PATIENT:

 

A.  Medications:  She is taking Duloxetine 60 mg daily; Clonidine 0.1 mg up to 
three times daily as a prn for anxiety; she takes albuterol inhaler, two puffs 
every four hours as a prn for wheezing; Gabapentin 600 mg p.o. t.i.d., 
magnesium oxide 400 mg p.o. daily; Methadone 5 mg p.o. t.i.d., Trazodone 100 mg 
p.o. at bedtime.

 

B.  Diet:  Regular.

 

C.  Activities:  As tolerated.  The patient is a smoker, but is declining the 
continued use of nicotine replacement therapies in the outpatient setting 
indicating her preference to continue smoking at this time.  In the event that 
she changes her mind and would like to quit, she was given access to the [facility] Smoker's Quitline at [phone].  There are no laboratory or 
diagnostic studies pending at the time of discharge.

 

D.  Follow-up care:  She will be seen at [facility] 
within one week of discharge for her intake.  In addition, she will be seeing 
Dr. Damion Avila at the [facility], also within one week of discharge.  
For monetary and housing support, she is being referred to the Department of 
 here in [facility].

 

E.  Substance abuse follow-up:  The patient was offered substance use treatment 
referrals, but she refused.

 

HOSPITAL COURSE - PART A: Reason for admission:  The patient is a 44-year-old, 
single, white female with a history of alcohol use disorder and depression who 
was brought to the emergency room by her ex-boyfriend, who is the father of her 
adult children, due to increased symptoms of depression, abuse of alcohol, 
homelessness, and inability to care for herself.  My understanding is that the 
patient has lost a great deal of weight recently, is only sleeping one hour 
night, and has been consuming close to a fifth of whiskey per day.  On exam, 
the patient notes that her significant other of the past 17 years committed 
suicide in October of this year.  Since then, she relapsed on alcohol following 
eight years of sobriety; last use was on the 23rd of November. She had been 
residing with her partner in [place] until his death.  At that time, she 
came to this area due to the fact that she has an adult son and daughter, as 
well as a sister and her son's father.  Initially, she was staying with her 
sister and then with her son, both of whom have problems of their own.  From 
there, she spent a night with her ex-partner, a man named Richard, who then 
brought her to the hospital the day after Thanksgiving.  The patient denies 
suicidal ideations and any history of harming others.  She did endorse historic 
manic symptoms, but these never lasted more than half a day or perhaps one day.
  She did endorse multiple symptoms, however, of unipolar depression, including 
decreased ability to sleep, anhedonia, limited energy, poor concentration, poor 
appetite, and psychomotor retardation.  She was denying thoughts of death.  
When asked what the patient wanted, she indicated that she just needed a place 
to stay for a couple of days, to get back on her medications, and she would 
like to get hooked up with services both for her physical health as well as her 
mental health care here in the community. She is hoping to reside now in 
[facility] to be closer with family members. She is highly somatic and 
complaining of back and neck pain, which I understand are chronic problems.  
Unfortunately, she does not recall the names of the pharmacies she used to 
receive opioid pain relievers, nor does she recall the names of any of her 
providing clinicians.

 

HOSPITAL COURSE - PART B: Psychiatric treatment rendered:  The patient was 
admitted to the Adult Behavioral Health Unit where she was placed on q.15 
minute checks for her own safety.  We immediately resumed treatment with 
Duloxetine, Hydroxyzine and Trazodone, and we added a trial of prn Clonidine 
for anxiety management.  She asked for treatment with Adderall and stimulants, 
but given her substance abuse history, we did not feel comfortable with this.  
She also complained bitterly of back and neck pain. She tended to stay in bed 
all day, being somatic, not going to groups and not participating.  We tried 
numerous strategies, including resuming her Gabapentin and titrating this up to 
600 mg three times daily.  We also put a 5% Lidoderm patch on her back 
throughout the day.  We also used prn acetaminophen and scheduled Diclofenac, 
which was ultimately titrated to 500 mg t.i.d.  The patient did not like the 
Diclofenac and felt that it was making her ankles edematous and for this reason 
it was discontinued.  We then requested a referral from the pain service here 
at [facility] and she was promptly seen by Dr. Daimon Avila, who was aware of her 
history of alcohol dependence and was similarly aware that she does not have 
any formal outpatient treatment at this point in this community.  Given her 
dynamics and history, he decided that a trial of Methadone would be most 
appropriate, initiating this at 5 mg p.o. t.i.d.  The patient was quite 
grateful for this and expressed a willingness to follow-up with Dr. Avila in 
his outpatient setting. It should be noted that at no point during this 
hospitalization, including in our emergency room, did she endorse suicidal 
ideations, nor did she during her hospitalization.  Most of her stressors 
included homelessness and lack of [facility] insurance.  For these issues, 
we are sending her to the Department of .  She is going to be 
taken there by her ex-boyfriend, who is allowing her to stay at his house until 
she can get these items resolved.  She has also been hooked up with [facility] and is showing a favorable attitude towards 
following through with this.  On the other hand, she has not shown much insight 
into her alcohol abuse leading up this admission, despite the fact that she was 
drinking up to a fifth of whiskey per day.  We could not even talk her into a 
referral to the [facility].  We are recommending that she attend 
Alcoholics Anonymous community meetings in the outpatient setting to which she 
agrees.

 

 [id number]/[facility] #: [id number]

GELACIO

## 2017-12-24 NOTE — PN
Parish WALSH SooYoung, scribed for Timothy Durham MD on 11/25/17 at 0349 .





Progress Note





- Progress Note


Date of Service: 11/25/17


Note: 





SO from PELON Fitzgerald pending E.








0347: Spoke with Mental Health Evaluator


Pt will be admitted to [facility] for unspecified mood disorder.








DX: UNSPECIFIED MOOD DISODER








Dispo: Stable, admit to [facility]














The documentation as recorded by the ayaanibParish anaya SooYoung accurately 

reflects the service I personally performed and the decisions made by me, Timothy Durham MD.

## 2018-03-04 NOTE — ED
Aneesh WALSH Nikita, scribed for Domenic Landa MD on 18 at 0244 .





Substance Abuse/Use





- HPI Summary


HPI Summary: 


This patient is a 44 year old F BIBA to ED with a chief complaint of not 

feeling well since a couple days ago. The patient got out of bed only once 

today. She called EMS and reports she has been drinking a lot (Black Velvet 

since 2 days ago) and has been depressed since the passing of her son from 

suicide 2 months ago. Her significant other of 17 years has also just committed 

suicide. The patient rates the pain 0/10 in severity. Symptoms aggravated by 

nothing. Symptoms alleviated by nothing. EMS reports incontinence upon arrival 

at residence. Patient reports ear pain, sore throat, and LOC. She reports she 

has not eaten since 2 days ago. Patient denies SI and HI. Patient reports to 

being an alcoholic.





- History Of Current Complaint


Chief Complaint: EDGeneral


Stated Complaint: N/V


Time Seen by Provider: 18 02:28


Hx Obtained From: Patient


Onset/Duration  of Drug/ETOH Abuse: Hours


Ingestion History: Approximate Time Of Ingestion - black velvet since 2 days ago


Timing Of Abuse: Binge Use


Character: Depressed


Aggravating Factor(s): Nothing


Alleviating Factor(s): Nothing


Associated Signs And Symptoms: Other: - EMS reports incontinence upon arrival 

at residence. Patient reports depression, ear pain, sore throat, and LOC. She 

reports she has not eaten since 2 days ago. Patient denies SI and HI.





- Allergies/Home Medications


Allergies/Adverse Reactions: 


 Allergies











Allergy/AdvReac Type Severity Reaction Status Date / Time


 


pregabalin Allergy  Anaphylatic Verified 18 02:32





   Shock  














PMH/Surg Hx/FS Hx/Imm Hx


Endocrine/Hematology History: 


   Denies: Hx Diabetes


Cardiovascular History: Reports: Hx Hypertension - TAKES MEDICATION


   Denies: Hx Pacemaker/ICD


Respiratory History: Reports: Hx Asthma


GI History: Reports: Other GI Disorders - hx of fatty liver disease


Musculoskeletal History: Reports: Hx Back Problems, Other Musculoskeletal 

History - hx of degenerative disc disease


Sensory History: Reports: Hx Contacts or Glasses


   Denies: Hx Hearing Aid


Opthamlomology History: Reports: Hx Contacts or Glasses


Neurological History: Reports: Hx Seizures - d/t etoh withdrawal 7 years ago


Psychiatric History: Reports: Hx Anxiety, Hx Depression, Hx Panic Disorder - 

ANXIETY, Hx Inpatient Treatment - following partner's suicide in 


   Denies: Hx of Violent Episodes Against Others





- Surgical History


Surgery Procedure, Year, and Place:  & [facility] SURGERY X2.  





- Immunization History


Date of Tetanus Vaccine: utd


Date of Influenza Vaccine: none


Infectious Disease History: No


Infectious Disease History: 


   Denies: Traveled Outside the [place] in Last 30 Days





- Family History


Known Family History: Positive: Other


   Negative: Diabetes


Family History: alcoholism, SI





- Social History


Alcohol Use: states she drinks etoh twice per week, six or more each time


Alcohol Amount: 1/5 a week


Hx Substance Use: No


Substance Use Type: Reports: Marijuana


Substance Use Comment - Amount & Last Used: used a couple weeks ago


Hx Tobacco Use: Yes


Smoking Status (MU): Heavy Every Day Tobacco Smoker


Type: Cigarettes


Amount Used/How Often: Patient states 2 1/2 packs daily.


Have You Smoked in the Last Year: Yes





Review of Systems


Positive: Other - alcohol intoxication


Positive: Sore Throat, Ear Ache


Positive: Other - Pt has not eaten in the past two days


Neurological: Other - LOC


Positive: Depressed, Other - denies SI and HI


All Other Systems Reviewed And Are Negative: Yes





Physical Exam





- Summary


Physical Exam Summary: 


Appearance: Well appearing, no pain distress


Skin: warm, dry, reflects adequate perfusion


Head/face: normal


Eyes: EOMI, JEN


ENT: normal, throat is clear, ears are normal, does not smell of alcohol


Neck: supple, non-tender


Respiratory: CTA, breath sounds present


Cardiovascular: RRR, pulses symmetrical 


Abdomen: non-tender, soft


Bowel: present


Musculoskeletal: normal, strength/ROM intact


Neuro: normal, sensory motor intact, A&Ox3


Triage Information Reviewed: Yes


Vital Signs On Initial Exam: 


 Initial Vitals











Temp Pulse Resp BP Pulse Ox


 


 99.6 F   106   18   124/89   97 


 


 18 01:34  18 01:34  18 01:34  18 01:34  18 01:34











Vital Signs Reviewed: Yes





Diagnostics





- Vital Signs


 Vital Signs











  Temp Pulse Resp BP Pulse Ox


 


 18 01:34  99.6 F  106  18  124/89  97














- Laboratory


Lab Results: 


 Lab Results











  18 Range/Units





  02:10 


 


Sodium  132 L  (133-145)  mmol/L


 


Potassium  3.6  (3.5-5.0)  mmol/L


 


Chloride  100 L  (101-111)  mmol/L


 


Carbon Dioxide  16 L  (22-32)  mmol/L


 


Anion Gap  16 H  (2-11)  mmol/L


 


BUN  9  (6-24)  mg/dL


 


Creatinine  0.44 L  (0.51-0.95)  mg/dL


 


Est GFR ( Amer)  199.8  (>60)  


 


Est GFR (Non-Af Amer)  155.3  (>60)  


 


BUN/Creatinine Ratio  20.5 H  (8-20)  


 


Glucose  76  ()  mg/dL


 


Calcium  9.6  (8.6-10.3)  mg/dL


 


Total Bilirubin  0.50  (0.2-1.0)  mg/dL


 


AST  23  (13-39)  U/L


 


ALT  13  (7-52)  U/L


 


Alkaline Phosphatase  94  ()  U/L


 


Total Protein  8.1  (6.4-8.9)  g/dL


 


Albumin  4.1  (3.2-5.2)  g/dL


 


Globulin  4.0  (2-4)  g/dL


 


Albumin/Globulin Ratio  1.0  (1-3)  


 


Lipase  < 10 L  (11.0-82.0)  U/L


 


Serum Alcohol  Pending  











Result Diagrams: 


 18 02:10





 18 02:10


Lab Statement: Any lab studies that have been ordered have been reviewed, and 

results considered in the medical decision making process.





- EKG


  ** 0300


Cardiac Rate: Tachycardia


EKG Rhythm: Sinus Tachycardia - 101 bpm


ST Segment: Normal


EKG Interpretation: normal axis interval





Course/Dx





- Course


Course Of Treatment: ETOH, with alcoholism. Not eating or really leaving bed. 

Recent death of son and close family member to suicide. Coherent initially. 

Sobered less that etoh 100 and was cleared for . Banana bag and thiamine 

given. Signed out at 7am pending findings of  eval.





- Diagnoses


Differential Diagnosis/HQI/PQRI: Positive: Other - alcoholism, depression, 

substance related, bereavement or adjustment disorder


Provider Diagnoses: 


 Alcoholism, Substance induced mood disorder, Major depression, recurrent








Discharge





- Discharge Plan


Condition: Stable


Disposition: OTHER


Discharge Disposition Comment: This pt will be signed out to Dr. Rodriguez, 

pending dispo, awaiting [facility].


Referrals: 


No Primary Care Phys,NOPCP [Primary Care Provider] - 





The documentation as recorded by the Aneesh jolly Nikita accurately reflects the 

service I personally performed and the decisions made by me, Domenic Landa MD.
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
## 2018-03-05 NOTE — PN
Subjective





- Subjective


Subjective: 


Rea in in bed, she c/o alcohol withdrawal symptoms (tremulousness, nausea, 

bodyaches). She continues to score on the WAM protocol. She endorses depressed 

mood, denies SI and she contracts for safety. Per staff, she has been seclusive 

to her room, sleeping mostly. 








Objective





- Appearance


Appearance: Other - in moderate distress


Hygiene: Normal


Grooming: Fairly Well Kept





- Behavior


Psychomotor Activities: Abnormal-Decreased


Exhibits Abnormal Movement: No





- Attitude and Relatedness


Attitude and Relatedness: Irritable


Eye Contact: Fair





- Speech


Quality: Unpressured


Latencies: Normal


Quantity: Terse





- Mood


Patient's Decription of Mood: "Terrible"





- Affect


Observed Affect: Constricted


Affect Consistent with: Dysphoria





- Thought Process


Patient's Thought Process: Coherent, Goal Directed


Thought Content: No Passive Death Wish, No Suicidal Planning, No Homicidal 

Ideation, No Paranoid Ideation





- Sensorium


Experiencing Hallucinations: No, Sensorium is Clear





- Level of Consciousness


Level of Consciousness: Alert


Orientation: Yes Intact





- Impulse Control


Impulse Control: Intact





- Insight and Judgement


Insight and Judgement: Poor





- Group Participation


Particating in Group Activities: Yes





- Medication Management


Medication Management Adherence: Yes





Assessment





- Assessment


Merits Inpatient Hospitalization: For Ongoing Evaluation, Consolidate 

Improvements, For Discharge Planning


Inpatient DSM-V Dx: F10.230


Clinical Impression: 


SUMMARY:  Third lifetime inpatient psychiatric admission for this 44-year-old 

female with history of alcohol dependence, repeated trauma, questionable 

adherence to outpatient psychiatric and substance abuse treatment who was self-

referred and was admitted on emergency status because of concern about 

suicidality after her drinking became out of control and she called 911.  

Medical history is remarkable for bronchial asthma, fatty liver disease and 

carpal tunnel syndrome.  The patient has recently exhibited pattern of drinking 

a half-liter of whisky a day and not taking her medication and not eating and 

not sleeping.  The patient's family history is significant for a son who 

recently completed suicide.  He had a history of addiction to drugs.  Both the 

patient's parents were alcoholics and the patient's other son is also using 

drugs.  The patient describes stressors of suicide of her boyfriend last fall 

and suicide of her son about 2 months ago, inability to stop drinking, 

inability to grieve and occupational and social support deficits.


 


DIAGNOSTIC IMPRESSIONS:


1.  Alcohol use disorder, severe.


2.  Major depressive disorder, recurrent, severe, without psychotic features.


3.  Posttraumatic stress disorder by history.





Patient is actively detoxing from alcohol. 








Plan





- Plan


Treatment Plan: 


Name: REA SHERWOOD                        


YOB: 1973                        


[id number]


[id number]











Medications: 


 Current Medications





Acetaminophen (Tylenol Tab*)  650 mg PO Q4H PRN


   PRN Reason: PAIN or TEMP > 101 F


Al Hydrox/Mg Hydrox/Simethicone (Maalox Plus*)  30 ml PO Q4H PRN


   PRN Reason: INDIGESTION


Albuterol (Ventolin Hfa Inhaler*)  2 puff INH Q4H PRN


   PRN Reason: SHORTNESS OF BREATH


Clonidine HCl (Catapres Tab*)  0.1 mg PO BID PRN


   PRN Reason: ANXIETY


Clonidine HCl (Catapres Tab*)  0.1 mg PO TID [facility]


   Last Admin: 03/05/18 14:08 Dose:  Not Given


Device (Nicotine Mouth Piece*)  1 each INH .CARTRIDGE [facility]


Duloxetine HCl (Cymbalta Cap*)  90 mg PO DAILY [facility]


   Last Admin: 03/05/18 10:20 Dose:  90 mg


Gabapentin (Neurontin Cap(*))  600 mg PO TID [facility]


   Last Admin: 03/05/18 14:08 Dose:  Not Given


Lorazepam (Ativan Tab(*))  0 - 6 mg PO .PER [facility] PARAMETERS [facility]


   PRN Reason: Protocol


   Last Admin: 03/04/18 21:31 Dose:  2 mg


Lorazepam (Ativan Inj*)  0 - 6 mg IM .PER [facility] PROTOCOL [place]


   PRN Reason: Protocol


Magnesium Oxide (Magox 400 Tab*)  400 mg PO DAILY [facility]


   Last Admin: 03/05/18 10:21 Dose:  400 mg


Multivitamins (Theragran Tab*)  1 tab PO DAILY [facility]


   Last Admin: 03/05/18 10:19 Dose:  1 tab


Nicotine (Nicotine Inhaler*)  10 mg INH Q2H PRN


   PRN Reason: CRAVING


Nicotine Polacrilex (Nicotine Gum*)  2 mg PO Q2H PRN


   PRN Reason: CRAVING


Quetiapine Fumarate (Seroquel Xr Tab*)  300 mg PO BEDTIME [facility]


   Last Admin: 03/04/18 21:26 Dose:  300 mg











- Discharge Plan


Discharge Plan: Outpatient Follow Up


Outpatient Program: [facility]

## 2018-03-06 NOTE — PN
Subjective





- Subjective


Service Type: 13494 Hosp care 25 min moderate complexity


Subjective: 


Patient seen by writer and  Poly Andrea LMSW to discuss 

treatment planning and goals.  Patient states she is "emotionally exhausted" 

related to physical pain.  She inquires about consult with pain specialist.  

She endorses withdrawal symptoms from alcohol: diaphoretic, nauseous, anxious 

and tremulous.  She states she wants "to stay sober" and is receptive to 

suggestions for medications for alcohol use disorder.  





Objective





- Appearance


Appearance: Thin Framed


Dysmorphic Features: Yes


Hygiene: Dirty


Grooming: Disheveled





- Behavior


Psychomotor Activities: Abnormal-Decreased


Exhibits Abnormal Movement: Yes





- Attitude and Relatedness


Attitude and Relatedness: Withdrawn


Eye Contact: Good





- Speech


Quality: Unpressured


Latencies: Normal


Quantity: Appropriate





- Mood


Patient's Decription of Mood: "exhausted"





- Affect


Observed Affect: Depressed


Affect Consistent with: Dysphoria





- Thought Process


Patient's Thought Process: Coherent, Goal Directed


Thought Content: No Passive Death Wish, No Suicidal Planning, No Homicidal 

Ideation, No Paranoid Ideation





- Sensorium


Experiencing Hallucinations: No, Sensorium is Clear


Type of Hallucinations: Visual: No, Auditory: No, Command: No





- Level of Consciousness


Level of Consciousness: Alert


Orientation: Yes Intact, Yes Orientated to Time, Yes Orientated to Place, Yes 

Orientated to Person





- Impulse Control


Impulse Control: Tenuous





- Insight and Judgement


Insight and Judgement: Fair





- Group Participation


Particating in Group Activities: No





- Medication Management


Medication Management Adherence: Yes





Assessment





- Assessment


Merits Inpatient Hospitalization: For Immediate Safety, For Stabilization, To 

Initiate Treatment, Pending Safe DC Plan


Inpatient DSM-V Dx: F10.230


Clinical Impression: 


Rea is a 43yo female with a history of PTSD, major depressive d/o and 

alcohol use d/o.  She presented to ED in the context of intoxication and grief 

related to son's completed suicide.  She is actively withdrawing from alcohol 

and methadone.  She merits hospitalization for immediate safety and 

stabilization. 





Plan





- Plan


Treatment Plan: 


Name: REA SHERWOOD                        


YOB: 1973                        


[id number]


[id number]








continue acute intensive psychiatric treatment.  encourage milieu, groups and 

ADLs. increase gabapentin to 800mg TID.  consult by pain specialist requested.  

patient declines referral to inpatient substance use treatment.  She is 

contemplating medications and outpatient treatment for alcohol use. 


Continued Medication Management: Consider Medication


Medications: 


 Current Medications





Acetaminophen (Tylenol Tab*)  650 mg PO Q4H PRN


   PRN Reason: PAIN or TEMP > 101 F


Al Hydrox/Mg Hydrox/Simethicone (Maalox Plus*)  30 ml PO Q4H PRN


   PRN Reason: INDIGESTION


Albuterol (Ventolin Hfa Inhaler*)  2 puff INH Q4H PRN


   PRN Reason: SHORTNESS OF BREATH


Clonidine HCl (Catapres Tab*)  0.1 mg PO BID PRN


   PRN Reason: ANXIETY


Clonidine HCl (Catapres Tab*)  0.1 mg PO TID [facility]


   Last Admin: 03/06/18 14:33 Dose:  0.1 mg


Device (Nicotine Mouth Piece*)  1 each INH .[place]TRIDGE [place]


Duloxetine HCl (Cymbalta Cap*)  90 mg PO DAILY [facility]


   Last Admin: 03/06/18 09:58 Dose:  90 mg


Gabapentin (Neurontin Cap(*))  800 mg PO TID [place]


Lorazepam (Ativan Tab(*))  0 - 6 mg PO .PER [place] PARAMETERS [place]


   PRN Reason: Protocol


   Last Admin: 03/04/18 21:31 Dose:  2 mg


Lorazepam (Ativan Inj*)  0 - 6 mg IM .PER [place] PROTOCOL [place]


   PRN Reason: Protocol


Magnesium Oxide (Magox 400 Tab*)  400 mg PO DAILY [facility]


   Last Admin: 03/06/18 09:59 Dose:  400 mg


Multivitamins (Theragran Tab*)  1 tab PO DAILY [facility]


   Last Admin: 03/06/18 09:59 Dose:  1 tab


Nicotine (Nicotine Inhaler*)  10 mg INH Q2H PRN


   PRN Reason: CRAVING


Nicotine Polacrilex (Nicotine Gum*)  2 mg PO Q2H PRN


   PRN Reason: CRAVING


Quetiapine Fumarate (Seroquel Xr Tab*)  300 mg PO BEDTIME [facility]


   Last Admin: 03/05/18 20:55 Dose:  300 mg











- Discharge Plan


Discharge Plan: Outpatient Follow Up


Outpatient Program: [facility]

## 2018-03-07 NOTE — CONSULT
Consult


Consult: 





INPATIENT PAIN CONSULTATION


Angeline Herbert is a 44 year old female. She injured her neck and back many 

years ago. She had two previous back surgeries done 15 years ago in [place] 

and a cervical fusion done by Dr. Garcia in [place] around 3 years ago. She 

says she suffers from chronic neck pain. She was living in [place] and saw 

a pain clinic there, and had a primary care doctor in [place] who wrote 

for pain medications. She says she was on OxyContin 10 BID and oxycodone 10 mg 

every 4 hours. The patient has a history of heavy alcohol use. She says her 

longtime partner committed suicide in October leading to a relapse of drinking. 

She was hospitalized in late November on the [facility] because her drinking was out 

of control. I had seen her at that time. I had agreed to write Methadone. She 

had a follow up appointment with me in the Pain Clinic. She never showed up for 

the appointment. She states that she missed the appointment due to the suicide 

of her son in January. It is unclear if she missed a second appointment. She 

was discharged from the Pain Clinic. She also had a relapse with her drinking. 

She says she came back to the [facility] because she knew she had a problem with 

alcohol. She says she was going to . She is hoping to get back on Methadone





PAST MEDICAL HISTORY:


Back surgeries, cervical fusion, asthma, alcohol abuse





ALLERGIES: Lyrica








 Current Medications





Acetaminophen (Tylenol Tab*)  650 mg PO Q4H PRN


   PRN Reason: PAIN or TEMP > 101 F


Al Hydrox/Mg Hydrox/Simethicone (Maalox Plus*)  30 ml PO Q4H PRN


   PRN Reason: INDIGESTION


Albuterol (Ventolin Hfa Inhaler*)  2 puff INH Q4H PRN


   PRN Reason: SHORTNESS OF BREATH


Clonidine HCl (Catapres Tab*)  0.1 mg PO BID PRN


   PRN Reason: ANXIETY


   Last Admin: 03/07/18 17:04 Dose:  0.1 mg


Clonidine HCl (Catapres Tab*)  0.1 mg PO TID [place]


   Last Admin: 03/07/18 13:50 Dose:  0.1 mg


Device (Nicotine Mouth Piece*)  1 each INH .CARTRIDGE [place]


Duloxetine HCl (Cymbalta Cap*)  90 mg PO DAILY [facility]


   Last Admin: 03/07/18 07:37 Dose:  90 mg


Gabapentin (Neurontin Cap(*))  800 mg PO TID [facility]


   Last Admin: 03/07/18 13:50 Dose:  800 mg


Guaifenesin (Mucinex*)  600 mg PO BID [facility]


Lorazepam (Ativan Tab(*))  0 - 6 mg PO .PER [facility] PARAMETERS [place]


   PRN Reason: Protocol


   Last Admin: 03/04/18 21:31 Dose:  2 mg


Lorazepam (Ativan Inj*)  0 - 6 mg IM .PER [facility] PROTOCOL [place]


   PRN Reason: Protocol


Magnesium Oxide (Magox 400 Tab*)  400 mg PO DAILY [facility]


   Last Admin: 03/07/18 07:37 Dose:  400 mg


Multivitamins (Theragran Tab*)  1 tab PO DAILY [facility]


   Last Admin: 03/07/18 07:37 Dose:  1 tab


Nicotine (Nicotine Inhaler*)  10 mg INH Q2H PRN


   PRN Reason: CRAVING


   Last Admin: 03/07/18 07:40 Dose:  10 mg


Nicotine Polacrilex (Nicotine Gum*)  2 mg PO Q2H PRN


   PRN Reason: CRAVING


   Last Admin: 03/07/18 07:40 Dose:  2 mg


Quetiapine Fumarate (Seroquel Xr Tab*)  300 mg PO BEDTIME [facility]


   Last Admin: 03/06/18 21:17 Dose:  300 mg





SOCIAL HISTORY:


Smoker, Alcohol abuse. She is staying with the father of her two boys in his 

house. He has a job, and had custody of the boys when she lived in [place]





 Vital Signs











Temp Pulse Resp BP Pulse Ox


 


 98.3 F   80   13   133/78   97 


 


 03/07/18 07:31  03/07/18 13:46  03/07/18 16:03  03/07/18 13:46  03/07/18 07:31








EXAM: 


LUNGS: Clear


HEART: reg rhythm


ABDOMEN: Soft


NEUROLOGIC: Alert, oriented. No focal weakness





ASSESSMENT: 


1. Failed neck surgery


2. Alcohol abuse





PLAN:


I will try to find out from the Pain Clinic why she was terminated. According 

to [facility], she received the one prescription from Dr. Simeon after she was 

discharged in December and never received another. It would be helpful to see 

any medical records from Dr. Garcia. Given her unreliability and recidivism, and 

history of alcohol abuse, she would be considered high risk for opioid 

management. Continue Gabapentin for now. I will follow

## 2018-03-07 NOTE — PN
Subjective





- Subjective


Service Type: 60730 Hosp care 25 min moderate complexity


Subjective: 


Patient is dysphoric, lying in bed.  She reports continued physical pain.  She 

explains that she was a client of pain clinic but was discharged when she 

cancelled appointments.  She states that her son's sudden death was a factor.  

In the meantime, she utilized her PCP at [place] and has been waiting for a 

referral to Dr Yin.  She reports desire to "get my pain under control" and be 

ambulatory without devices. 





Patient becomes tearful when talking about her son, Victor Manuel, who was a victim of 

suicide in January.  She states she is also concerned about her other son, as 

he is engaging in unhealthy behaviors.  





She is receptive to prompts to complete ADLs and increase participation in 

groups.  





Objective





- Appearance


Dysmorphic Features: Yes


Hygiene: Dirty


Grooming: Disheveled





- Behavior


Psychomotor Activities: Abnormal-Decreased





- Attitude and Relatedness


Attitude and Relatedness: Withdrawn


Eye Contact: Fair





- Speech


Quality: Unpressured


Latencies: Normal


Quantity: Appropriate





- Mood


Patient's Decription of Mood: "tired"





- Affect


Observed Affect: Tearful


Affect Consistent with: Dysphoria





- Thought Process


Patient's Thought Process: Circumstantial, Impoverished


Thought Content: Yes Passive Death Wish, No Suicidal Planning, No Homicidal 

Ideation, No Paranoid Ideation





- Sensorium


Experiencing Hallucinations: No, Sensorium is Clear


Type of Hallucinations: Visual: No, Auditory: No, Command: No





- Level of Consciousness


Level of Consciousness: Alert


Orientation: Yes Intact, Yes Orientated to Time, Yes Orientated to Place, Yes 

Orientated to Person





- Impulse Control


Impulse Control: Poor





- Insight and Judgement


Insight and Judgement: Poor





- Group Participation


Particating in Group Activities: No





- Medication Management


Medication Management Adherence: Yes





Assessment





- Assessment


Merits Inpatient Hospitalization: For Immediate Safety, For Stabilization


Inpatient DSM-V Dx: F10.230


Clinical Impression: 


Angeline is a 45yo female with a history of PTSD, major depressive d/o and 

alcohol use d/o.  She presented to ED in the context of intoxication and grief 

related to son's completed suicide.  She is actively withdrawing from alcohol 

and methadone.  She merits hospitalization for immediate safety and 

stabilization. 





Plan





- Plan


Treatment Plan: 


Name: ANGELINE SHERWOOD                        


YOB: 1973                        


[id number]


[id number]








continue acute intensive psychiatric treatment.  encourage milieu, groups and 

ADLs. increase gabapentin to 800mg TID.  consult by pain specialist requested.  

patient declines referral to inpatient substance use treatment.  She is 

contemplating medications and outpatient treatment for alcohol use. 


Continued Medication Management: Continue Outpt Medication


Medications: 


 Current Medications





Acetaminophen (Tylenol Tab*)  650 mg PO Q4H PRN


   PRN Reason: PAIN or TEMP > 101 F


Al Hydrox/Mg Hydrox/Simethicone (Maalox Plus*)  30 ml PO Q4H PRN


   PRN Reason: INDIGESTION


Albuterol (Ventolin Hfa Inhaler*)  2 puff INH Q4H PRN


   PRN Reason: SHORTNESS OF BREATH


Clonidine HCl (Catapres Tab*)  0.1 mg PO BID PRN


   PRN Reason: ANXIETY


   Last Admin: 03/06/18 18:50 Dose:  0.1 mg


Clonidine HCl (Catapres Tab*)  0.1 mg PO TID [facility]


   Last Admin: 03/07/18 13:50 Dose:  0.1 mg


Device (Nicotine Mouth Piece*)  1 each INH .CARTRIDGE [facility]


Duloxetine HCl (Cymbalta Cap*)  90 mg PO DAILY [facility]


   Last Admin: 03/07/18 07:37 Dose:  90 mg


Gabapentin (Neurontin Cap(*))  800 mg PO TID [facility]


   Last Admin: 03/07/18 13:50 Dose:  800 mg


Guaifenesin (Mucinex*)  600 mg PO BID [facility]


Lorazepam (Ativan Tab(*))  0 - 6 mg PO .PER [facility] PARAMETERS [place]


   PRN Reason: Protocol


   Last Admin: 03/04/18 21:31 Dose:  2 mg


Lorazepam (Ativan Inj*)  0 - 6 mg IM .PER [facility] PROTOCOL [facility]


   PRN Reason: Protocol


Magnesium Oxide (Magox 400 Tab*)  400 mg PO DAILY [facility]


   Last Admin: 03/07/18 07:37 Dose:  400 mg


Multivitamins (Theragran Tab*)  1 tab PO DAILY [facility]


   Last Admin: 03/07/18 07:37 Dose:  1 tab


Nicotine (Nicotine Inhaler*)  10 mg INH Q2H PRN


   PRN Reason: CRAVING


   Last Admin: 03/07/18 07:40 Dose:  10 mg


Nicotine Polacrilex (Nicotine Gum*)  2 mg PO Q2H PRN


   PRN Reason: CRAVING


   Last Admin: 03/07/18 07:40 Dose:  2 mg


Quetiapine Fumarate (Seroquel Xr Tab*)  300 mg PO BEDTIME [place]


   Last Admin: 03/06/18 21:17 Dose:  300 mg











- Discharge Plan


Discharge Plan: Outpatient Follow Up


Outpatient Program: [facility]

## 2018-03-08 NOTE — PN
<Cheri Soto - Last Filed: 03/08/18 16:33>





Subjective





- Subjective


Service Type: 43485 Hosp care 15 min low complexity


Subjective: 


Angeline pleasant on approach. She c/o neck and back pain, she is agreeable to 

using Lidocaine patch. States she has social anxiety and it's hard for her to 

be around people this after she was noted to be social and congenial during 

medication education group. States she is anxious to go home, wants to attend 

Monday neurosurgeon appointment. Diaphoretic, reports this is r/t "going 

through the change". She is hoping to see  for pain management, we 

discussed.fact that  doesn't feel she is a candidate for opiate pain 

relief. 





Objective





- Appearance


Appearance: Thin Framed


Dysmorphic Features: Yes


Hygiene: Normal


Grooming: Disheveled





- Behavior


Psychomotor Activities: Abnormal-Decreased


Exhibits Abnormal Movement: Yes





- Attitude and Relatedness


Attitude and Relatedness: Superficially Cooperative


Eye Contact: Poor





- Speech


Quality: Unpressured


Latencies: Normal


Quantity: Appropriate





- Mood


Patient's Decription of Mood: "Okay"





- Affect


Observed Affect: Depressed


Affect Consistent with: Dysphoria





- Thought Process


Patient's Thought Process: Coherent


Thought Content: No Passive Death Wish, No Suicidal Planning, No Homicidal 

Ideation, No Paranoid Ideation





- Sensorium


Experiencing Hallucinations: No, Sensorium is Clear


Type of Hallucinations: Visual: No, Auditory: No, Command: No





- Level of Consciousness


Level of Consciousness: Agitated


Orientation: Yes Intact, Yes Orientated to Time, Yes Orientated to Place, Yes 

Orientated to Person





- Impulse Control


Impulse Control: Poor





- Insight and Judgement


Insight and Judgement: Poor





- Group Participation


Particating in Group Activities: Yes





- Medication Management


Medication Management Adherence: Yes





Assessment





- Assessment


Merits Inpatient Hospitalization: Consolidate Improvements, For Discharge 

Planning


Inpatient DSM-V Dx: F10.230


Clinical Impression: 


44 year old female with alcohol use d/o, PTSD, major depressive d/o presented 

to ED in the context of intoxication and grief r/t son's Jan. 2018, completed 

suicide. She has withdrawn from alcohol and methadone. Requires continued 

hospitalization for consolidation of improvements and discharge planning.





Plan





- Plan


Treatment Plan: 


Name: ANGELINE SHERWOOD                        


YOB: 1973                        


[id number]


[id number]











Medications: 


 Current Medications





Acetaminophen (Tylenol Tab*)  650 mg PO Q4H PRN


   PRN Reason: PAIN or TEMP > 101 F


Al Hydrox/Mg Hydrox/Simethicone (Maalox Plus*)  30 ml PO Q4H PRN


   PRN Reason: INDIGESTION


Albuterol (Ventolin Hfa Inhaler*)  2 puff INH Q4H PRN


   PRN Reason: SHORTNESS OF BREATH


Clonidine HCl (Catapres Tab*)  0.1 mg PO BID PRN


   PRN Reason: ANXIETY


   Last Admin: 03/08/18 11:51 Dose:  0.1 mg


Clonidine HCl (Catapres Tab*)  0.1 mg PO TID [facility]


   Last Admin: 03/08/18 14:45 Dose:  0.1 mg


Device (Nicotine Mouth Piece*)  1 each INH .CARTRIDGE [facility]


Duloxetine HCl (Cymbalta Cap*)  90 mg PO DAILY [facility]


   Last Admin: 03/08/18 08:37 Dose:  90 mg


Gabapentin (Neurontin Cap(*))  800 mg PO TID [facility]


   Last Admin: 03/08/18 14:44 Dose:  800 mg


Guaifenesin (Mucinex*)  600 mg PO BID [facility]


   Last Admin: 03/08/18 08:39 Dose:  600 mg


Ibuprofen (Motrin Tab*)  600 mg PO Q6H PRN


   PRN Reason: PAIN


   Last Admin: 03/08/18 10:38 Dose:  600 mg


Lidocaine (Lidoderm 5% Patch*)  1 patch TRANSDERM DAILY [facility]


Magnesium Oxide (Magox 400 Tab*)  400 mg PO DAILY [facility]


   Last Admin: 03/08/18 08:38 Dose:  400 mg


Multivitamins (Theragran Tab*)  1 tab PO DAILY [facility]


   Last Admin: 03/08/18 08:37 Dose:  1 tab


Nicotine (Nicotine Inhaler*)  10 mg INH Q2H PRN


   PRN Reason: CRAVING


   Last Admin: 03/07/18 07:40 Dose:  10 mg


Nicotine Polacrilex (Nicotine Gum*)  2 mg PO Q2H PRN


   PRN Reason: CRAVING


   Last Admin: 03/08/18 11:51 Dose:  2 mg


Pharmacy Profile Note (Lidocaine Patch Remove*)  1 note N/A 2100 [facility]


Quetiapine Fumarate (Seroquel Xr Tab*)  300 mg PO BEDTIME [facility]


   Last Admin: 03/07/18 20:52 Dose:  300 mg











<Vicki Rae - Last Filed: 03/09/18 09:01>





Plan





- Plan


Treatment Plan: 


Name: ANGELINE SHERWOOD                        


YOB: 1973                        


[id number]


[id number]








continue acute intensive psychiatric treatment. decrease to q30min observation.

  add lidocaine patches.  obtain collateral from family members for discharge 

planning.  


Continued Medication Management: Continue Outpt Medication


Medications: 


 Current Medications





Acetaminophen (Tylenol Tab*)  650 mg PO Q4H PRN


   PRN Reason: PAIN or TEMP > 101 F


Al Hydrox/Mg Hydrox/Simethicone (Maalox Plus*)  30 ml PO Q4H PRN


   PRN Reason: INDIGESTION


Albuterol (Ventolin Hfa Inhaler*)  2 puff INH Q4H PRN


   PRN Reason: SHORTNESS OF BREATH


Clonidine HCl (Catapres Tab*)  0.1 mg PO BID PRN


   PRN Reason: ANXIETY


   Last Admin: 03/08/18 11:51 Dose:  0.1 mg


Clonidine HCl (Catapres Tab*)  0.1 mg PO TID [facility]


   Last Admin: 03/08/18 21:26 Dose:  0.1 mg


Device (Nicotine Mouth Piece*)  1 each INH .CARTRIDGE [facility]


Duloxetine HCl (Cymbalta Cap*)  90 mg PO DAILY [facility]


   Last Admin: 03/08/18 08:37 Dose:  90 mg


Gabapentin (Neurontin Cap(*))  800 mg PO TID [facility]


   Last Admin: 03/08/18 21:27 Dose:  800 mg


Guaifenesin (Mucinex*)  600 mg PO BID [facility]


   Last Admin: 03/08/18 21:28 Dose:  Not Given


Ibuprofen (Motrin Tab*)  600 mg PO Q6H PRN


   PRN Reason: PAIN


   Last Admin: 03/08/18 17:35 Dose:  600 mg


Lidocaine (Lidoderm 5% Patch*)  1 patch TRANSDERM DAILY [facility]


   Last Admin: 03/08/18 17:36 Dose:  1 patch


Magnesium Oxide (Magox 400 Tab*)  400 mg PO DAILY [facility]


   Last Admin: 03/08/18 08:38 Dose:  400 mg


Multivitamins (Theragran Tab*)  1 tab PO DAILY [facility]


   Last Admin: 03/08/18 08:37 Dose:  1 tab


Nicotine (Nicotine Inhaler*)  10 mg INH Q2H PRN


   PRN Reason: CRAVING


   Last Admin: 03/07/18 07:40 Dose:  10 mg


Nicotine Polacrilex (Nicotine Gum*)  2 mg PO Q2H PRN


   PRN Reason: CRAVING


   Last Admin: 03/08/18 11:51 Dose:  2 mg


Pharmacy Profile Note (Lidocaine Patch Remove*)  1 note N/A 2100 [facility]


   Last Admin: 03/08/18 21:28 Dose:  1 note


Quetiapine Fumarate (Seroquel Xr Tab*)  300 mg PO BEDTIME [facility]


   Last Admin: 03/08/18 21:27 Dose:  300 mg











- Discharge Plan


Discharge Plan: Outpatient Follow Up


[facility]

## 2018-03-08 NOTE — PN
MHU: Group Therapy Note





- Service Type


Service Type: 08530 Group Psychotherapy - Medication Education Group:  Patient 

was attentive and participatory in group, and remained in good behavioral 

control.  Patient expressed positive insights regarding relevant treatment 

interventions.  Patient stated understanding of material discussed and had 

appropriate questions.

## 2018-03-09 NOTE — PN
Subjective





- Subjective


Service Type: 12528 Hosp care 15 min low complexity


Subjective: 


Patient presented with concerns about being discharged due to limited insight 

and engagement in treatment.   She states "you've already made up your mind, 

leave me alone."  Writer and  encouraged patient to communicate 

her plans when discharged.  She declined to discuss further.  





Objective





- Appearance


Appearance: Thin Framed


Dysmorphic Features: Yes


Hygiene: Dirty


Grooming: Disheveled





- Behavior


Psychomotor Activities: Normal


Exhibits Abnormal Movement: No





- Attitude and Relatedness


Attitude and Relatedness: Withdrawn


Eye Contact: Fair





- Speech


Quality: Unpressured


Latencies: Normal


Quantity: Terse





- Mood


Patient's Decription of Mood: "Upset"





- Affect


Observed Affect: Depressed


Affect Consistent with: Dysphoria





- Thought Process


Patient's Thought Process: Circumstantial


Thought Content: No Passive Death Wish, No Suicidal Planning, No Homicidal 

Ideation, No Paranoid Ideation





- Sensorium


Experiencing Hallucinations: No, Sensorium is Clear


Type of Hallucinations: Visual: No, Auditory: No, Command: No





- Level of Consciousness


Level of Consciousness: Alert


Orientation: Yes Intact, Yes Orientated to Time, Yes Orientated to Place, Yes 

Orientated to Person





- Impulse Control


Impulse Control: Poor





- Insight and Judgement


Insight and Judgement: Poor





- Group Participation


Particating in Group Activities: No





- Medication Management


Medication Management Adherence: Yes





Assessment





- Assessment


Merits Inpatient Hospitalization: For Immediate Safety, For Stabilization, For 

Ongoing Evaluation, For Discharge Planning, Pending Safe DC Plan


Inpatient DSM-V Dx: F10.230


Clinical Impression: 


Rea is a 45yo female with a history of PTSD, major depressive d/o and 

alcohol use d/o.  She presented to ED in the context of intoxication and grief 

related to son's completed suicide.  She is actively withdrawing from alcohol 

and methadone and has poor insight into need for dual diagnoses treatment. She 

merits hospitalization for immediate safety and stabilization. 





Plan





- Plan


Treatment Plan: 


Name: REA SHERWOOD                        


YOB: 1973                        


[id number]


[id number]








continue acute intensive psychiatric treatment. decrease to q30min observation.

  obtain collateral from family members for discharge planning.  


Continued Medication Management: Continue Outpt Medication


Medications: 


 Current Medications





Acetaminophen (Tylenol Tab*)  650 mg PO Q4H PRN


   PRN Reason: PAIN or TEMP > 101 F


Al Hydrox/Mg Hydrox/Simethicone (Maalox Plus*)  30 ml PO Q4H PRN


   PRN Reason: INDIGESTION


Albuterol (Ventolin Hfa Inhaler*)  2 puff INH Q4H PRN


   PRN Reason: SHORTNESS OF BREATH


Clonidine HCl (Catapres Tab*)  0.1 mg PO BID PRN


   PRN Reason: ANXIETY


   Last Admin: 03/09/18 12:09 Dose:  0.1 mg


Clonidine HCl (Catapres Tab*)  0.1 mg PO TID [facility]


   Last Admin: 03/09/18 09:11 Dose:  0.1 mg


Device (Nicotine Mouth Piece*)  1 each INH .CARTRIDGE [facility]


Duloxetine HCl (Cymbalta Cap*)  90 mg PO DAILY [facility]


   Last Admin: 03/09/18 09:11 Dose:  90 mg


Gabapentin (Neurontin Cap(*))  800 mg PO TID [facility]


   Last Admin: 03/09/18 09:11 Dose:  800 mg


Guaifenesin (Mucinex*)  600 mg PO BID [facility]


   Last Admin: 03/09/18 09:12 Dose:  Not Given


Ibuprofen (Motrin Tab*)  600 mg PO Q6H PRN


   PRN Reason: PAIN


   Last Admin: 03/09/18 11:04 Dose:  600 mg


Lidocaine (Lidoderm 5% Patch*)  1 patch TRANSDERM DAILY [facility]


   Last Admin: 03/09/18 09:12 Dose:  1 patch


Magnesium Oxide (Magox 400 Tab*)  400 mg PO DAILY [facility]


   Last Admin: 03/09/18 09:12 Dose:  400 mg


Multivitamins (Theragran Tab*)  1 tab PO DAILY [facility]


   Last Admin: 03/09/18 09:12 Dose:  1 tab


Nicotine (Nicotine Inhaler*)  10 mg INH Q2H PRN


   PRN Reason: CRAVING


   Last Admin: 03/07/18 07:40 Dose:  10 mg


Nicotine Polacrilex (Nicotine Gum*)  2 mg PO Q2H PRN


   PRN Reason: CRAVING


   Last Admin: 03/08/18 11:51 Dose:  2 mg


Pharmacy Profile Note (Lidocaine Patch Remove*)  1 note N/A [facility]100 [facility]


   Last Admin: 03/08/18 21:28 Dose:  1 note


Quetiapine Fumarate (Seroquel Xr Tab*)  300 mg PO BEDTIME [facility]


   Last Admin: 03/08/18 21:27 Dose:  300 mg











- Discharge Plan


Discharge Plan: Outpatient Follow Up


Outpatient Program: [facility]

## 2018-03-11 NOTE — PN
Subjective





- Subjective


Date of Service: 03/11/18


Service Type: 87605 Hosp care 15 min low complexity


Subjective: 





Martita reports she is getting better. Still experiencing ups and down of mood 

with anxiety. Denies psychosis, SI or HI. Compliant with mes and groups.





Objective





- Appearance


Appearance: Obese


Dysmorphic Features: No


Hygiene: Normal


Grooming: Well Kept





- Behavior


Psychomotor Activities: Normal


Exhibits Abnormal Movement: No





- Attitude and Relatedness


Attitude and Relatedness: Cooperative


Eye Contact: Good





- Speech


Quality: Unpressured


Latencies: Normal


Quantity: Appropriate





- Mood


Patient's Decription of Mood: "Sad"





- Affect


Observed Affect: Constricted


Affect Consistent with: Dysphoria





- Thought Process


Patient's Thought Process: Coherent, Goal Directed


Thought Content: No Passive Death Wish, No Suicidal Planning, No Homicidal 

Ideation, No Paranoid Ideation





- Sensorium


Experiencing Hallucinations: No, Sensorium is Clear


Type of Hallucinations: Visual: No, Auditory: No, Command: No





- Level of Consciousness


Level of Consciousness: Alert


Orientation: Yes Intact, Yes Orientated to Time, Yes Orientated to Place, Yes 

Orientated to Person





- Impulse Control


Impulse Control: Intact





- Insight and Judgement


Insight and Judgement: Fair





- Group Participation


Particating in Group Activities: Yes





- Medication Management


Medication Management Adherence: Yes





Assessment





- Assessment


Merits Inpatient Hospitalization: For Immediate Safety, For Stabilization, 

Pending Safe DC Plan


Inpatient DSM-V Dx: F10.230





Plan





- Plan


Treatment Plan: 


Name: REA SHERWOOD                        


YOB: 1973                        


[id number]


[id number]











Continued Medication Management: Continue Outpt Medication


Medications: 


 Current Medications





Acetaminophen (Tylenol Tab*)  650 mg PO Q4H PRN


   PRN Reason: PAIN or TEMP > 101 F


   Last Admin: 03/11/18 12:19 Dose:  650 mg


Al Hydrox/Mg Hydrox/Simethicone (Maalox Plus*)  30 ml PO Q4H PRN


   PRN Reason: INDIGESTION


Albuterol (Ventolin Hfa Inhaler*)  2 puff INH Q4H PRN


   PRN Reason: SHORTNESS OF BREATH


Clonidine HCl (Catapres Tab*)  0.1 mg PO BID PRN


   PRN Reason: ANXIETY


   Last Admin: 03/11/18 19:37 Dose:  0.1 mg


Clonidine HCl (Catapres Tab*)  0.1 mg PO TID [facility]


   Last Admin: 03/11/18 14:07 Dose:  0.1 mg


Device (Nicotine Mouth Piece*)  1 each INH .CARTRIDGE [facility]


Duloxetine HCl (Cymbalta Cap*)  90 mg PO DAILY [facility]


   Last Admin: 03/11/18 09:10 Dose:  90 mg


Gabapentin (Neurontin Cap(*))  800 mg PO TID [facility]


   Last Admin: 03/11/18 14:07 Dose:  800 mg


Guaifenesin (Mucinex*)  600 mg PO BID [facility]


   Last Admin: 03/11/18 09:13 Dose:  Not Given


Ibuprofen (Motrin Tab*)  600 mg PO Q6H PRN


   PRN Reason: PAIN


   Last Admin: 03/11/18 17:52 Dose:  600 mg


Lidocaine (Lidoderm 5% Patch*)  1 patch TRANSDERM DAILY [facility]


   Last Admin: 03/11/18 12:18 Dose:  1 patch


Magnesium Oxide (Magox 400 Tab*)  400 mg PO DAILY [facility]


   Last Admin: 03/11/18 09:11 Dose:  400 mg


Multivitamins (Theragran Tab*)  1 tab PO DAILY [facility]


   Last Admin: 03/11/18 09:11 Dose:  1 tab


Nicotine (Nicotine Inhaler*)  10 mg INH Q2H PRN


   PRN Reason: CRAVING


   Last Admin: 03/07/18 07:40 Dose:  10 mg


Nicotine Polacrilex (Nicotine Gum*)  2 mg PO Q2H PRN


   PRN Reason: CRAVING


   Last Admin: 03/11/18 12:19 Dose:  2 mg


Pharmacy Profile Note (Lidocaine Patch Remove*)  1 note N/A 2100 [facility]


   Last Admin: 03/10/18 21:56 Dose:  1 note


Quetiapine Fumarate (Seroquel Xr Tab*)  300 mg PO BEDTIME [facility]


   Last Admin: 03/10/18 22:33 Dose:  300 mg











- Discharge Plan


Discharge Plan: Drug/Alcohol Rehab

## 2018-10-08 NOTE — ED
Complex/Multi-Sys Presentation





- HPI Summary


HPI Summary: 





A 45 y/o F BIBA presents to ED with c/o weakness onset 3 days ago. Associated sx

: vomiting last episode 6 hours PTA; fever; polydipsia. Denies diarrhea. Her 

last BM was this AM, the stool was brown colored. At bedside, pt is trembling 

and states "my whole body hurts." She did not take any medication PTA. PMHx: 

anxiety, depression; knee and back surgery. Denies DM. She says her last ETOH 

intake was 3-4 days ago.








- History Of Current Complaint


Chief Complaint: EDGeneral


Hx Obtained From: Patient


Onset/Duration: Gradual Onset, Lasting Days, Still Present


Timing: Constant


Severity Currently: Moderate


Severity Initially: Moderate


Associated Signs And Symptoms: Positive: Weakness, Vomiting, Other - pos: 

polydipsia.  Negative: Diarrhea, Melena





- Allergies/Home Medications


Allergies/Adverse Reactions: 


 Allergies











Allergy/AdvReac Type Severity Reaction Status Date / Time


 


pregabalin Allergy  Anaphylatic Verified 18 02:32





   Shock  














PMH/Surg Hx/FS Hx/Imm Hx


Previously Healthy: No


Endocrine/Hematology History: 


   Denies: Hx Diabetes


Cardiovascular History: Reports: Hx Hypertension


   Denies: Hx Pacemaker/ICD


Respiratory History: Reports: Hx Asthma


GI History: Reports: Other GI Disorders - hx of fatty liver disease


Musculoskeletal History: Reports: Hx Back Problems, Other Musculoskeletal 

History - hx of degenerative disc disease


Sensory History: Reports: Hx Contacts or Glasses


   Denies: Hx Hearing Aid


Opthamlomology History: Reports: Hx Contacts or Glasses


Neurological History: Reports: Hx Seizures - d/t etoh withdrawal 7 years ago


Psychiatric History: Reports: Hx Anxiety, Hx Depression, Hx Panic Disorder - 

ANXIETY, Hx Inpatient Treatment - following partner's suicide in , 

Hx Substance Abuse


   Denies: Hx Eating Disorder, Hx of Violent Episodes Against Others





- Surgical History


Surgery Procedure, Year, and Place:  & [facility] SURGERY X2.  





- Immunization History


Date of Tetanus Vaccine: utd


Date of Influenza Vaccine: none


Infectious Disease History: No


Infectious Disease History: 


   Denies: Traveled Outside the [place] in Last 30 Days





- Family History


Known Family History: Positive: Other


   Negative: Diabetes


Family History: alcoholism, SI





- Social History


Occupation: Unemployed


Lives: With Family


Alcohol Use: Daily


Alcohol Amount: 1/5 a week


Hx Substance Use: No


Substance Use Type: Reports: None


Substance Use Comment - Amount & Last Used: used a couple weeks ago


Hx Tobacco Use: Yes


Smoking Status (MU): Heavy Every Day Tobacco Smoker


Type: Cigarettes


Amount Used/How Often: states she smokes 1 PPD


Have You Smoked in the Last Year: Yes





Review of Systems


Positive: Fever, Other - pos: polydipsia


Positive: Vomiting.  Negative: Diarrhea, Other - neg: melena


Positive: Weakness


All Other Systems Reviewed And Are Negative: Yes





Physical Exam





- Summary


Physical Exam Summary: 





GENERAL:  Patient is a well-developed, nourished and ill-appearing FEMALE who 

has coarse tremors while lying in the stretcher. Patient is not in any acute 

respiratory distress.


HEAD AND FACE: No signs of trauma. No ecchymosis, hematomas or skull 

depressions. No sinus tenderness.


EYES: PERRLA, EOMI x 2, No injected conjunctiva, no nystagmus.


EARS: Hearing grossly intact. Ear canals and tympanic membranes are within 

normal limits.


MOUTH: Oropharynx within normal limits.


NECK: Supple, trachea is midline, no adenopathy, no JVD, no carotid bruit, no c-

spine tenderness, neck with full ROM.


CHEST: Symmetric, no tenderness at palpation


LUNGS: Clear to auscultation bilaterally. No wheezing or crackles.


CVS: Tachy, S1 and S2 present, no murmurs or gallops appreciated.


ABDOMEN: Soft, non-tender. No signs of distention. No rebound no guarding, and 

no masses palpated. Bowel sounds are normal.


EXTREMITIES: FROM in all major joints, no edema, no cyanosis or clubbing.


NEURO: Alert and oriented x 3. No acute neurological deficits. Speech is normal 

and follows commands.


SKIN: Dry and warm. Pale.








Triage Information Reviewed: Yes


Vital Signs On Initial Exam: 


 Initial Vitals











Temp Pulse Resp BP Pulse Ox


 


 97.5 F   130   38   104/81   96 


 


 10/08/18 20:54  10/08/18 20:54  10/08/18 20:54  10/08/18 20:54  10/08/18 20:54











Vital Signs Reviewed: Yes





Procedures





- Central Line


  ** 1


Central Line Procedure: betadine prep, sterile drapes applied, sterile dressing 

applied


Central Line Position: internal jugular (R) - with U/S guidance


Anesthesia: Lidocaine - 2%


cc's of anesthesia: 5


Complications: none


Central Line Post Position: sutured, good blood return, position confirmed w/ 

CXR





Diagnostics





- Vital Signs


 Vital Signs











  Temp Pulse Resp BP Pulse Ox


 


 10/08/18 20:54  97.5 F  130  38  104/81  96














- Laboratory


Result Diagrams: 


 10/08/18 21:54





 10/08/18 21:55


Lab Statement: Any lab studies that have been ordered have been reviewed, and 

results considered in the medical decision making process.





- Radiology


  ** CXR


Xray Interpretation: Positive (See Comments) - Confirm triple lumen CVC 

placement to R IJ. Pending official report.


Radiology Interpretation Completed By: ED Physician





- CT


  ** C/A/P CT


CT Interpretation: Positive (See Comments) - Chest IMPRESSION: There is patchy 

opacity at the lung bases suspicious for pneumonitis and/or atelcetasis. A/P 

IMPRESSION: No acute CT pathology in the abd or pelvis. ED provider has 

reviewed this report.


CT Interpretation Completed By: Radiologist





- EKG


  ** 2110


Cardiac Rate: Tachycardia - 123bpm


EKG Rhythm: Sinus Tachycardia


EKG Interpretation: nml axis, nml interval, no ischemic changes





  ** 2320


Cardiac Rate: Tachycardia - 118bpm


EKG Rhythm: Sinus Tachycardia





Re-Evaluation





- Re-Evaluation


  ** 1


Re-Evaluation Time: 21:47


Change: Worse


Comment: Pt c/o diffuse body pain and is requesting pain medication. PICC line 

attempted but unsuccessful, will perform central line.





  ** 2


Re-Evaluation Time: 22:15


Comment: Placing CVC. 2 amps bicarb and vigorous hydration given. Given IV 

antibiotics.





  ** 3


Re-Evaluation Time: 23:00


Change: Improved


Comment: Pt looks better after completion of fluids.





  ** 4


Re-Evaluation Time: 23:10


Change: Improved





  ** 5


Re-Evaluation Time: 23:29





Complex Multi-Symp Course/Dx


Course Of Treatment: Pt is a 45 y/o ill-appearing F BIBA presenting with 

weakness onset 3 days ago. Associated sx: vomiting last episode 6 hours PTA; 

fever; polydipsia. Denies diarrhea, melena. Her last BM was this AM. Last ETOH 

drink was 3-4 days ago.  After additional discussion, pt states her last drink 

was yesterday. ETOH is 133.  Central line procedure at 2215. Pt was found to 

have severe metabolic acidosis, pt given 2 amps bicarb, followed by vigorous 

hydration. Pt given IV antibiotics. Pt looks better after completion of fluids. 

Will Repeat ABG. pH is improved.  Hospitalist paged at 0113. Consulted with Dr. Darling, hospitalist, who will accept pt to ICU.





- Diagnoses


Provider Diagnoses: 


 UTI (urinary tract infection), Sepsis, Metabolic acidosis








- Physician Notifications


Discussed Care Of Patient With: Autumn Darling - hospitalist


Time Discussed With Above Provider: 01:52


Instructed by Provider To: Admit As Inpatient





- Critical Care Time


Critical Care Time:  min - 75 mins





Discharge





- Sign-Out/Discharge


Documenting (check all that apply): Patient Departure - ADM





- Discharge Plan


Disposition: ADMITTED TO [facility]


Referrals: 


No Primary Care Phys,NOPCP [Primary Care Provider] - 





- Attestation Statements


Document Initiated by Scribe: Yes


Documenting Scribe: Breana Lugo


Provider For Whom Scribe is Documenting (Include Credential): Dr. Timothy Durham MD


Scribe Attestation: 


I, Breana Lugo, scribed for Dr. Timothy Durham MD on 10/09/18 at 0156.

## 2018-10-09 NOTE — RAD
EXAM:

  CT Abdomen and Pelvis Without Intravenous Contrast



CLINICAL HISTORY:

  44 years old, female; Pain; Abdominal pain; Generalized; Chest pain; Type not 

specified; Patient HX: Pt reports n/v, general body aches and fevers at home x3 

days. Pt is alcoholic, last drink 3 days ago. Tremors noted. ; Additional info: 

Abd pain



TECHNIQUE:

  Axial computed tomography images of the abdomen and pelvis without 

intravenous contrast.  All CT scans at this facility use at least one of these 

dose optimization techniques: automated exposure control; mA and/or kV 

adjustment per patient size (includes targeted exams where dose is matched to 

clinical indication); or iterative reconstruction.

  Coronal and sagittal reformatted images were created and reviewed.



COMPARISON:

  No relevant prior studies available.



FINDINGS:

  Lung bases:  Unremarkable.  No mass.  No consolidation.



 ABDOMEN:

  Liver:  Unremarkable.

  Gallbladder and bile ducts:  Unremarkable.  No calcified stones.  No ductal 

dilation.

  Pancreas:  Unremarkable.  No ductal dilation.

  Spleen:  Unremarkable.  No splenomegaly.

  Adrenals:  Unremarkable.  No mass.

  Kidneys and ureters:  Unremarkable.  No obstructing stones.  No 

hydronephrosis.

  Stomach and bowel:  Unremarkable.  No obstruction.  No mucosal thickening.



 PELVIS:

  Appendix:  No findings to suggest acute appendicitis.

  Bladder:  A Powers catheter is present in the urinary bladder.  No stones.

  Reproductive:  Unremarkable as visualized.



 ABDOMEN and PELVIS:

  Intraperitoneal space:  Unremarkable.  No free air.  No significant fluid 

collection.

  Bones/joints:  There are degenerative changes of the spine.  No acute 

fracture.  No dislocation.

  Soft tissues:  Unremarkable.

  Vasculature:  There are atherosclerotic aortic and iliac and femoral artery 

calcifications.  There are calcified phleboliths in the pelvis.  No abdominal 

aortic aneurysm.

  Lymph nodes:  Unremarkable.  No enlarged lymph nodes.

  Other findings:  Findings in the lower thorax are described on the CT chest 

dictation of the same day.



IMPRESSION:     

  No acute CT pathology in the abdomen or pelvis.



_______________________________________________



EXAM:

  CT Chest Without Intravenous Contrast



CLINICAL HISTORY:

  44 years old, female; Pain; Abdominal pain; Generalized; Chest pain; Type not 

specified; Patient HX: Pt reports n/v, general body aches and fevers at home x3 

days. Pt is alcoholic, last drink 3 days ago. Tremors noted. ; Additional info: 

Abd pain



TECHNIQUE:

  Axial computed tomography images of the chest without intravenous contrast.  

All CT scans at this facility use at least one of these dose optimization 

techniques: automated exposure control; mA and/or kV adjustment per patient 

size (includes targeted exams where dose is matched to clinical indication); or 

iterative reconstruction.

  Coronal and sagittal reformatted images were created and reviewed.



COMPARISON:

  No relevant prior studies available.



FINDINGS:

  Lungs:  There is patchy opacity at the lung bases suspicious for pneumonitis 

and/or atelectasis.  There is mild paraseptal emphysema.

  Pleural space:  Unremarkable.  No pneumothorax.  No significant effusion.

  Heart:  Unremarkable.  No cardiomegaly.  No significant pericardial effusion.

  Bones/joints:  There are postoperative changes of the lower cervical spine.  

No acute fracture.  No dislocation.

  Soft tissues:  Unremarkable.

  Vasculature:  Unremarkable.  No thoracic aortic aneurysm.

  Lymph nodes:  Unremarkable.  No enlarged lymph nodes.

  Intraperitoneal space:  Findings in the upper abdomen are described in the CT 

abdomen and pelvis dictation of the same day.



IMPRESSION:     

  There is patchy opacity at the lung bases suspicious for pneumonitis and/or 

atelectasis.



To contact [facility] with a general question: Operations Center - [phone]

For direct physician to physician contact: Physician Hotline - [phone]

[facility] ([facility] Facility ID #[id number])

## 2018-10-09 NOTE — RAD
HISTORY: weakness



COMPARISONS: None



VIEWS: 1: frontal AP view of the chest at 10:53 PM 



FINDINGS:

LINES AND TUBES: A central venous catheter is noted from a right internal jugular vein

approach with the tip overlying the cavoatrial junction..

CARDIOMEDIASTINAL SILHOUETTE: The cardiomediastinal silhouette is normal for portable

technique.

PLEURA: The costophrenic angles are sharp. No pleural abnormalities are noted.

LUNG PARENCHYMA: The lungs are clear.

ABDOMEN: The upper abdomen is clear. There is no subphrenic gas.

BONES AND SOFT TISSUES: The patient is status post anterior cervical fusion. 



IMPRESSION: LINES AND TUBES AS ABOVE. NO ACTIVE CARDIOPULMONARY DISEASE.



R1

## 2018-10-09 NOTE — PN
Date of Service: 10/09/18


Critical Care Services: 





44F with h/o alcoholism presents with ams. Found to have severe metabolic 

acidosis 2/2 alcoholic keto acidos vs starvation ketosis.





10/9: Patient more awake. Feeling slightly better.


Vital Signs: 











Temp Pulse Resp BP SpO2 FiO2


 


99.9 F 116 23 130/80 98 21


 


10/09/18 09:00 10/09/18 09:00 10/09/18 09:00 10/09/18 09:00 10/09/18 09:00 10/09

/18 05:25











Physical Exam: 





Gen - uncomfortable appearing


HEENT - ncat, eomi, perrl


Neck - +RIJ TLC


CV - s1/s2, no murmur, +tachy


lungs - cta, no wheeze


abd - soft, nt, nd


Ext - no cce


Neuro - tremulous, following commands





Fluid Balance (Past 24 Hours): 


I=     O=     Net 


 Intake & Output











 10/07/18 10/08/18 10/09/18 10/10/18





 06:59 06:59 06:59 06:59


 


Intake Total   5425 


 


Output Total   2075 425


 


Balance   3350 -425


 


Weight   65.7 kg 


 


Intake:    


 


  IV Fluids   5150 


 


  IVPB   260 


 


    1/2 ns with sod bicarb   260 


 


  Medicated IV   15 


 


    zosyn   15 


 


Output:    


 


  Powers   2075 


 


  Straight Cath    425





 








Labs: 


 Laboratory Results - last 24 hr











  10/08/18 10/08/18 10/08/18





  21:30 21:31 21:54


 


WBC    17.5 H


 


RBC    4.41


 


Hgb    14.4


 


Hct    45


 


MCV    102 H


 


MCH    33 H


 


MCHC    32


 


RDW    16 H


 


Plt Count    223


 


MPV    8.2


 


Neut % (Auto)    91.3 H


 


Lymph % (Auto)    2.4 L


 


Mono % (Auto)    5.9


 


Eos % (Auto)    0


 


Baso % (Auto)    0.4


 


Absolute Neuts (auto)    15.9 H


 


Absolute Lymphs (auto)    0.4 L


 


Absolute Monos (auto)    1.0 H


 


Absolute Eos (auto)    0


 


Absolute Basos (auto)    0.1


 


Absolute Nucleated RBC    0


 


Nucleated RBC %    0.1


 


INR (Anticoag Therapy)   


 


APTT   


 


ABG pH   


 


ABG pCO2   


 


ABG pO2   


 


ABG HCO3   


 


ABG O2 Saturation   


 


ABG Base Excess   


 


Sodium   


 


Potassium   


 


Chloride   


 


Carbon Dioxide   


 


Anion Gap   


 


BUN   


 


Creatinine   


 


Est GFR ( Amer)   


 


Est GFR (Non-Af Amer)   


 


BUN/Creatinine Ratio   


 


Glucose   


 


POC Glucose (mg/dL)   203 H 


 


Serum Osmolality   


 


Lactic Acid   


 


Calcium   


 


Phosphorus   


 


Magnesium   


 


Total Bilirubin   


 


AST   


 


ALT   


 


Alkaline Phosphatase   


 


Ammonia   


 


Total Creatine Kinase   


 


Troponin I   


 


C-Reactive Protein   


 


Total Protein   


 


Albumin   


 


Globulin   


 


Albumin/Globulin Ratio   


 


Amylase   


 


Lipase   


 


Urine Color   


 


Urine Appearance   


 


Urine pH   


 


Ur Specific Gravity   


 


Urine Protein   


 


Urine Ketones   


 


Urine Blood   


 


Urine Nitrate   


 


Urine Bilirubin   


 


Urine Urobilinogen   


 


Ur Leukocyte Esterase   


 


Urine WBC (Auto)   


 


Urine RBC (Auto)   


 


Ur Squamous Epith Cells   


 


Urine Bacteria   


 


Hyaline Casts   


 


Granular Casts   


 


Urine Glucose   


 


Urine Opiates Screen   


 


Ur Barbiturates Screen   


 


Ur Phencyclidine Scrn   


 


Ur Amphetamines Screen   


 


U Benzodiazepines Scrn   


 


Urine Cocaine Screen   


 


U Cannabinoids Screen   


 


Serum Alcohol   


 


Influenza A (Rapid)  Negative  


 


Influenza B (Rapid)  Negative  


 


Blood Type   


 


Antibody Screen   














  10/08/18 10/08/18 10/08/18





  21:54 21:54 21:55


 


WBC   


 


RBC   


 


Hgb   


 


Hct   


 


MCV   


 


MCH   


 


MCHC   


 


RDW   


 


Plt Count   


 


MPV   


 


Neut % (Auto)   


 


Lymph % (Auto)   


 


Mono % (Auto)   


 


Eos % (Auto)   


 


Baso % (Auto)   


 


Absolute Neuts (auto)   


 


Absolute Lymphs (auto)   


 


Absolute Monos (auto)   


 


Absolute Eos (auto)   


 


Absolute Basos (auto)   


 


Absolute Nucleated RBC   


 


Nucleated RBC %   


 


INR (Anticoag Therapy)  0.88  


 


APTT  33.4  


 


ABG pH   


 


ABG pCO2   


 


ABG pO2   


 


ABG HCO3   


 


ABG O2 Saturation   


 


ABG Base Excess   


 


Sodium    133 L


 


Potassium    3.8


 


Chloride    89 L


 


Carbon Dioxide    < 7 L*


 


Anion Gap    Not Reportable


 


BUN    17


 


Creatinine    2.05 H


 


Est GFR ( Amer)    31.8


 


Est GFR (Non-Af Amer)    26.3


 


BUN/Creatinine Ratio    8.3


 


Glucose    230 H


 


POC Glucose (mg/dL)   


 


Serum Osmolality   


 


Lactic Acid   6.8 H* 


 


Calcium    8.3 L


 


Phosphorus   


 


Magnesium   


 


Total Bilirubin    0.40


 


AST    79 H


 


ALT    61 H


 


Alkaline Phosphatase    130 H


 


Ammonia   


 


Total Creatine Kinase    39


 


Troponin I    0.00


 


C-Reactive Protein    9.23 H


 


Total Protein    7.6


 


Albumin    4.3


 


Globulin    3.3


 


Albumin/Globulin Ratio    1.3


 


Amylase    88


 


Lipase    177 H


 


Urine Color   


 


Urine Appearance   


 


Urine pH   


 


Ur Specific Gravity   


 


Urine Protein   


 


Urine Ketones   


 


Urine Blood   


 


Urine Nitrate   


 


Urine Bilirubin   


 


Urine Urobilinogen   


 


Ur Leukocyte Esterase   


 


Urine WBC (Auto)   


 


Urine RBC (Auto)   


 


Ur Squamous Epith Cells   


 


Urine Bacteria   


 


Hyaline Casts   


 


Granular Casts   


 


Urine Glucose   


 


Urine Opiates Screen   


 


Ur Barbiturates Screen   


 


Ur Phencyclidine Scrn   


 


Ur Amphetamines Screen   


 


U Benzodiazepines Scrn   


 


Urine Cocaine Screen   


 


U Cannabinoids Screen   


 


Serum Alcohol    133 H


 


Influenza A (Rapid)   


 


Influenza B (Rapid)   


 


Blood Type   


 


Antibody Screen   














  10/08/18 10/08/18 10/08/18





  21:56 22:06 23:21


 


WBC   


 


RBC   


 


Hgb   


 


Hct   


 


MCV   


 


MCH   


 


MCHC   


 


RDW   


 


Plt Count   


 


MPV   


 


Neut % (Auto)   


 


Lymph % (Auto)   


 


Mono % (Auto)   


 


Eos % (Auto)   


 


Baso % (Auto)   


 


Absolute Neuts (auto)   


 


Absolute Lymphs (auto)   


 


Absolute Monos (auto)   


 


Absolute Eos (auto)   


 


Absolute Basos (auto)   


 


Absolute Nucleated RBC   


 


Nucleated RBC %   


 


INR (Anticoag Therapy)   


 


APTT   


 


ABG pH   < 7.00 L* 


 


ABG pCO2   < 20 L 


 


ABG pO2   124 H 


 


ABG HCO3   1.6 L* 


 


ABG O2 Saturation   98.0 


 


ABG Base Excess   -29.9 L 


 


Sodium   


 


Potassium   


 


Chloride   


 


Carbon Dioxide   


 


Anion Gap   


 


BUN   


 


Creatinine   


 


Est GFR ( Amer)   


 


Est GFR (Non-Af Amer)   


 


BUN/Creatinine Ratio   


 


Glucose   


 


POC Glucose (mg/dL)   


 


Serum Osmolality   


 


Lactic Acid   


 


Calcium   


 


Phosphorus   


 


Magnesium   


 


Total Bilirubin   


 


AST   


 


ALT   


 


Alkaline Phosphatase   


 


Ammonia   


 


Total Creatine Kinase   


 


Troponin I   


 


C-Reactive Protein   


 


Total Protein   


 


Albumin   


 


Globulin   


 


Albumin/Globulin Ratio   


 


Amylase   


 


Lipase   


 


Urine Color    Yellow


 


Urine Appearance    Cloudy


 


Urine pH    5.0


 


Ur Specific Gravity    1.014


 


Urine Protein    2+(100 mg/dl) A


 


Urine Ketones    1+ A


 


Urine Blood    3+ A


 


Urine Nitrate    Negative


 


Urine Bilirubin    Negative


 


Urine Urobilinogen    Negative


 


Ur Leukocyte Esterase    Negative


 


Urine WBC (Auto)    1+(6-10/hpf) A


 


Urine RBC (Auto)    2+(6-10/hpf) A


 


Ur Squamous Epith Cells    Present A


 


Urine Bacteria    1+ A


 


Hyaline Casts    Present A


 


Granular Casts    Present A


 


Urine Glucose    Negative


 


Urine Opiates Screen   


 


Ur Barbiturates Screen   


 


Ur Phencyclidine Scrn   


 


Ur Amphetamines Screen   


 


U Benzodiazepines Scrn   


 


Urine Cocaine Screen   


 


U Cannabinoids Screen   


 


Serum Alcohol   


 


Influenza A (Rapid)   


 


Influenza B (Rapid)   


 


Blood Type  A Positive  


 


Antibody Screen  Negative  














  10/08/18 10/09/18 10/09/18





  23:21 00:10 00:13


 


WBC   


 


RBC   


 


Hgb   


 


Hct   


 


MCV   


 


MCH   


 


MCHC   


 


RDW   


 


Plt Count   


 


MPV   


 


Neut % (Auto)   


 


Lymph % (Auto)   


 


Mono % (Auto)   


 


Eos % (Auto)   


 


Baso % (Auto)   


 


Absolute Neuts (auto)   


 


Absolute Lymphs (auto)   


 


Absolute Monos (auto)   


 


Absolute Eos (auto)   


 


Absolute Basos (auto)   


 


Absolute Nucleated RBC   


 


Nucleated RBC %   


 


INR (Anticoag Therapy)   


 


APTT   


 


ABG pH   7.05 L* 


 


ABG pCO2   < 20 L 


 


ABG pO2   110 H 


 


ABG HCO3   6.0 L* 


 


ABG O2 Saturation   98.0 


 


ABG Base Excess   -24.2 L 


 


Sodium   


 


Potassium   


 


Chloride   


 


Carbon Dioxide   


 


Anion Gap   


 


BUN   


 


Creatinine   


 


Est GFR ( Amer)   


 


Est GFR (Non-Af Amer)   


 


BUN/Creatinine Ratio   


 


Glucose   


 


POC Glucose (mg/dL)   


 


Serum Osmolality   


 


Lactic Acid    6.0 H*


 


Calcium   


 


Phosphorus   


 


Magnesium   


 


Total Bilirubin   


 


AST   


 


ALT   


 


Alkaline Phosphatase   


 


Ammonia   


 


Total Creatine Kinase   


 


Troponin I   


 


C-Reactive Protein   


 


Total Protein   


 


Albumin   


 


Globulin   


 


Albumin/Globulin Ratio   


 


Amylase   


 


Lipase   


 


Urine Color   


 


Urine Appearance   


 


Urine pH   


 


Ur Specific Gravity   


 


Urine Protein   


 


Urine Ketones   


 


Urine Blood   


 


Urine Nitrate   


 


Urine Bilirubin   


 


Urine Urobilinogen   


 


Ur Leukocyte Esterase   


 


Urine WBC (Auto)   


 


Urine RBC (Auto)   


 


Ur Squamous Epith Cells   


 


Urine Bacteria   


 


Hyaline Casts   


 


Granular Casts   


 


Urine Glucose   


 


Urine Opiates Screen  None detected  


 


Ur Barbiturates Screen  None detected  


 


Ur Phencyclidine Scrn  None detected  


 


Ur Amphetamines Screen  None detected  


 


U Benzodiazepines Scrn  None detected  


 


Urine Cocaine Screen  None detected  


 


U Cannabinoids Screen  None detected  


 


Serum Alcohol   


 


Influenza A (Rapid)   


 


Influenza B (Rapid)   


 


Blood Type   


 


Antibody Screen   














  10/09/18 10/09/18 10/09/18





  03:21 03:21 05:10


 


WBC    4.1


 


RBC    3.53 L


 


Hgb    11.6 L


 


Hct    34 L


 


MCV    96


 


MCH    33 H


 


MCHC    34


 


RDW    16 H


 


Plt Count    133 L


 


MPV    7.2 L


 


Neut % (Auto)    77.0


 


Lymph % (Auto)    14.0 L


 


Mono % (Auto)    8.8 H


 


Eos % (Auto)    0


 


Baso % (Auto)    0.2


 


Absolute Neuts (auto)    3.2


 


Absolute Lymphs (auto)    0.6 L


 


Absolute Monos (auto)    0.4


 


Absolute Eos (auto)    0


 


Absolute Basos (auto)    0


 


Absolute Nucleated RBC    0


 


Nucleated RBC %    0.2


 


INR (Anticoag Therapy)   


 


APTT   


 


ABG pH   


 


ABG pCO2   


 


ABG pO2   


 


ABG HCO3   


 


ABG O2 Saturation   


 


ABG Base Excess   


 


Sodium   136 


 


Potassium   3.5 


 


Chloride   101 


 


Carbon Dioxide   7 L* 


 


Anion Gap   28 H 


 


BUN   14 


 


Creatinine   1.11 H 


 


Est GFR ( Amer)   64.6 


 


Est GFR (Non-Af Amer)   53.4 


 


BUN/Creatinine Ratio   12.6 


 


Glucose   133 H 


 


POC Glucose (mg/dL)   


 


Serum Osmolality  296 H  


 


Lactic Acid   


 


Calcium   6.5 L 


 


Phosphorus   2.0 L 


 


Magnesium   1.2 L 


 


Total Bilirubin   0.60 


 


AST   55 H 


 


ALT   41 


 


Alkaline Phosphatase   104 


 


Ammonia   


 


Total Creatine Kinase   


 


Troponin I   


 


C-Reactive Protein   


 


Total Protein   5.7 L 


 


Albumin   3.2 


 


Globulin   2.5 


 


Albumin/Globulin Ratio   1.3 


 


Amylase   


 


Lipase   


 


Urine Color   


 


Urine Appearance   


 


Urine pH   


 


Ur Specific Gravity   


 


Urine Protein   


 


Urine Ketones   


 


Urine Blood   


 


Urine Nitrate   


 


Urine Bilirubin   


 


Urine Urobilinogen   


 


Ur Leukocyte Esterase   


 


Urine WBC (Auto)   


 


Urine RBC (Auto)   


 


Ur Squamous Epith Cells   


 


Urine Bacteria   


 


Hyaline Casts   


 


Granular Casts   


 


Urine Glucose   


 


Urine Opiates Screen   


 


Ur Barbiturates Screen   


 


Ur Phencyclidine Scrn   


 


Ur Amphetamines Screen   


 


U Benzodiazepines Scrn   


 


Urine Cocaine Screen   


 


U Cannabinoids Screen   


 


Serum Alcohol   


 


Influenza A (Rapid)   


 


Influenza B (Rapid)   


 


Blood Type   


 


Antibody Screen   














  10/09/18 10/09/18 10/09/18





  05:25 08:00 08:00


 


WBC   


 


RBC   


 


Hgb   


 


Hct   


 


MCV   


 


MCH   


 


MCHC   


 


RDW   


 


Plt Count   


 


MPV   


 


Neut % (Auto)   


 


Lymph % (Auto)   


 


Mono % (Auto)   


 


Eos % (Auto)   


 


Baso % (Auto)   


 


Absolute Neuts (auto)   


 


Absolute Lymphs (auto)   


 


Absolute Monos (auto)   


 


Absolute Eos (auto)   


 


Absolute Basos (auto)   


 


Absolute Nucleated RBC   


 


Nucleated RBC %   


 


INR (Anticoag Therapy)   0.92 


 


APTT   


 


ABG pH  7.31 L  


 


ABG pCO2  < 20 L  


 


ABG pO2  112 H  


 


ABG HCO3  12.6 L  


 


ABG O2 Saturation  98.0  


 


ABG Base Excess  -15.8 L  


 


Sodium   


 


Potassium   


 


Chloride   


 


Carbon Dioxide   


 


Anion Gap   


 


BUN   


 


Creatinine   


 


Est GFR ( Amer)   


 


Est GFR (Non-Af Amer)   


 


BUN/Creatinine Ratio   


 


Glucose   


 


POC Glucose (mg/dL)   


 


Serum Osmolality   


 


Lactic Acid   


 


Calcium   


 


Phosphorus   


 


Magnesium   


 


Total Bilirubin   


 


AST   


 


ALT   


 


Alkaline Phosphatase   


 


Ammonia    65 H


 


Total Creatine Kinase   


 


Troponin I   


 


C-Reactive Protein   


 


Total Protein   


 


Albumin   


 


Globulin   


 


Albumin/Globulin Ratio   


 


Amylase   


 


Lipase   


 


Urine Color   


 


Urine Appearance   


 


Urine pH   


 


Ur Specific Gravity   


 


Urine Protein   


 


Urine Ketones   


 


Urine Blood   


 


Urine Nitrate   


 


Urine Bilirubin   


 


Urine Urobilinogen   


 


Ur Leukocyte Esterase   


 


Urine WBC (Auto)   


 


Urine RBC (Auto)   


 


Ur Squamous Epith Cells   


 


Urine Bacteria   


 


Hyaline Casts   


 


Granular Casts   


 


Urine Glucose   


 


Urine Opiates Screen   


 


Ur Barbiturates Screen   


 


Ur Phencyclidine Scrn   


 


Ur Amphetamines Screen   


 


U Benzodiazepines Scrn   


 


Urine Cocaine Screen   


 


U Cannabinoids Screen   


 


Serum Alcohol   


 


Influenza A (Rapid)   


 


Influenza B (Rapid)   


 


Blood Type   


 


Antibody Screen   














  10/09/18





  08:00


 


WBC 


 


RBC 


 


Hgb 


 


Hct 


 


MCV 


 


MCH 


 


MCHC 


 


RDW 


 


Plt Count 


 


MPV 


 


Neut % (Auto) 


 


Lymph % (Auto) 


 


Mono % (Auto) 


 


Eos % (Auto) 


 


Baso % (Auto) 


 


Absolute Neuts (auto) 


 


Absolute Lymphs (auto) 


 


Absolute Monos (auto) 


 


Absolute Eos (auto) 


 


Absolute Basos (auto) 


 


Absolute Nucleated RBC 


 


Nucleated RBC % 


 


INR (Anticoag Therapy) 


 


APTT 


 


ABG pH 


 


ABG pCO2 


 


ABG pO2 


 


ABG HCO3 


 


ABG O2 Saturation 


 


ABG Base Excess 


 


Sodium 


 


Potassium 


 


Chloride 


 


Carbon Dioxide 


 


Anion Gap 


 


BUN 


 


Creatinine 


 


Est GFR ( Amer) 


 


Est GFR (Non-Af Amer) 


 


BUN/Creatinine Ratio 


 


Glucose 


 


POC Glucose (mg/dL) 


 


Serum Osmolality 


 


Lactic Acid  0.7


 


Calcium 


 


Phosphorus 


 


Magnesium 


 


Total Bilirubin 


 


AST 


 


ALT 


 


Alkaline Phosphatase 


 


Ammonia 


 


Total Creatine Kinase 


 


Troponin I 


 


C-Reactive Protein 


 


Total Protein 


 


Albumin 


 


Globulin 


 


Albumin/Globulin Ratio 


 


Amylase 


 


Lipase 


 


Urine Color 


 


Urine Appearance 


 


Urine pH 


 


Ur Specific Gravity 


 


Urine Protein 


 


Urine Ketones 


 


Urine Blood 


 


Urine Nitrate 


 


Urine Bilirubin 


 


Urine Urobilinogen 


 


Ur Leukocyte Esterase 


 


Urine WBC (Auto) 


 


Urine RBC (Auto) 


 


Ur Squamous Epith Cells 


 


Urine Bacteria 


 


Hyaline Casts 


 


Granular Casts 


 


Urine Glucose 


 


Urine Opiates Screen 


 


Ur Barbiturates Screen 


 


Ur Phencyclidine Scrn 


 


Ur Amphetamines Screen 


 


U Benzodiazepines Scrn 


 


Urine Cocaine Screen 


 


U Cannabinoids Screen 


 


Serum Alcohol 


 


Influenza A (Rapid) 


 


Influenza B (Rapid) 


 


Blood Type 


 


Antibody Screen 











Studies: 





CXR 10/8


IMPRESSION: LINES AND TUBES AS ABOVE. NO ACTIVE CARDIOPULMONARY DISEASE.





CT Abd/Pel 10/8


IMPRESSION:


No acute CT pathology in the abdomen or pelvis.





CT Chest 10/8


IMPRESSION:


There is patchy opacity at the lung bases suspicious for pneumonitis and/or 

atelectasis.





Impression: 





44F with alcoholism presents with severe metabolic acidosis 2/2 alcoholic 

ketoacidosis/starvation ketosis





Plan: 





Neuro - pain control


- monitor for withdrawal


- ativan prn





CV - monitor bp 





Pulm - oxygenating ok





ID - wbc count elevated but improved with hydration


- given empiric vanc/zosyn in er


- no clear source of infection


- will dc abx for now


- f/u cultures





GI - transaminitis


- 2/2 alcohol 


- npo for now


- thiamine/folate/mvi


- nutrition consult





Renal - Alcoholic Keto Acidosis


- iv hydration


- serial bmp


- replete electrolytes


- stop bicarb gtt


- start LR@ 150/hr


- monitor i/o





Heme - monitor cbc





Endo - check fs, niss





Lines - RIJ TLC





PPx - gi/dvt





Full Code





Critical Care Time: 65 mins

## 2018-10-09 NOTE — ADMNOTE
Subjective


Date of Service: 10/09/18


Interval History: 





hpi 


44 yr old wf with hx of chronic etoh dep still drinks about 750 cc vodka daily 

presented to er with c/o of increasing three days of weakness but still manges 

to drink ----> last drink was 10/8 night etoh level on admission was 130 ---> 

pt initial abg showed ph <7 and bicarb of 1.6 got ivf two liter bolus with two 

amp of bicarb ivp repeat abg showed slight improved of abg with ph barely 7.05 

and bicarb of 6. despite of abnormal labs pt has stable vital. she was found to 

have uti and given levaquin ---> pt's sbp 134 but tachycardia which suggests to 

this writer she might detoxing ---> pt was trying to ask for pain med but this 

writer told her that this problem would be monitered as she goes along in icu 

once she stablizes ----> called icu director Dr Judd wanted serum osmo which 

was added to admission and ordered for 5 am ---> Dr Judd will take pt to his 

service in am 


Family History: Findings - etoh from mom


Social History: Findings - chronic etoh dep will drink daily with vodka 750 cc 

since age 30 just went to detox and stayed there 40 days and just came out from 

there. cig 0.5 to 1 ppd no ivda


Past Medical History: Findings - chronic etoh dep cig smoker ascites mood 

disorder with both depression/anxiety ptsd psoriasis hx of carpal tunnel 

surgery s/p neck and lower back surgery





Review of Systems





- Measurements


Intake and Output: 


Intake and Output Last 24 Hours











 10/07/18 10/08/18 10/09/18 10/10/18





 06:59 06:59 06:59 06:59


 


Intake Total   5425 


 


Output Total   2075 425


 


Balance   3350 -425


 


Weight   144 lb 13.499 oz 


 


Intake:    


 


  IV Fluids   5150 


 


  IVPB   260 


 


    1/2 ns with sod bicarb   260 


 


  Medicated IV   15 


 


    zosyn   15 


 


Output:    


 


  Powers   2075 


 


  Straight Cath    425














- Review of Systems


General Comments: 





please refer to hpi for details 12/12 ros reviewed with her 





Objective


Active Medications: 








Acetaminophen (Tylenol Tab*)  650 mg PO Q4H PRN


   PRN Reason: PAIN


Folic Acid (Folvite Tab*)  1 mg PO DAILY [place]


Heparin Sodium (Porcine) (Heparin Vial(*))  5,000 units SUBCUT Q12HR [facility]


Lactated Ringer's (Lactated Ringers 1000 Ml Bag*)  1,000 mls @ 150 mls/hr IV 

PER RATE [place]


   Stop: 10/10/18 09:06


Magnesium Sulfate 3 gm/ Sodium (Chloride)  106 mls @ 53 mls/hr IVPB ONCE ONE


   Stop: 10/09/18 11:14


Lorazepam (Ativan Inj*)  0 - 6 mg IM .PER [facility] PROTOCOL [place]; Protocol


Morphine Sulfate (Morphine Vial*)  2 mg IV Q4H PRN


   PRN Reason: PAIN - MILD


Multivitamins/Minerals (Theragran/Minerals Tab*)  1 tab PO DAILY [facility]


Ondansetron HCl (Zofran Inj*)  4 mg IV Q6H PRN


   PRN Reason: NAUSEA


Thiamine HCl (Vitamin B-1 Tab*)  100 mg PO DAILY [facility]








 Vital Signs - 8 hr











  10/09/18 10/09/18 10/09/18





  01:50 02:00 02:20


 


Temperature   


 


Pulse Rate 119 122 124


 


Respiratory 26 23 26





Rate   


 


Blood Pressure 144/90  134/89





(mmHg)   


 


O2 Sat by Pulse 97 97 97





Oximetry   














  10/09/18 10/09/18 10/09/18





  02:51 02:54 03:00


 


Temperature  100.9 F 100.0 F


 


Pulse Rate 121 124 124


 


Respiratory 22 27 29





Rate   


 


Blood Pressure 145/90 145/90 134/86





(mmHg)   


 


O2 Sat by Pulse 98 100 94





Oximetry   














  10/09/18 10/09/18 10/09/18





  03:13 03:16 03:30


 


Temperature   100.0 F


 


Pulse Rate 122 121 122


 


Respiratory 27 19 25





Rate   


 


Blood Pressure 134/86 126/90 137/85





(mmHg)   


 


O2 Sat by Pulse 99 97 99





Oximetry   














  10/09/18 10/09/18 10/09/18





  04:00 04:30 05:00


 


Temperature 100.0 F 100.0 F 100.0 F


 


Pulse Rate 118 118 113


 


Respiratory 24 22 26





Rate   


 


Blood Pressure 144/89 146/87 145/99





(mmHg)   


 


O2 Sat by Pulse 100 99 99





Oximetry   














  10/09/18 10/09/18 10/09/18





  05:30 05:47 06:00


 


Temperature 99.9 F  99.9 F


 


Pulse Rate 115  115


 


Respiratory 23 23 25





Rate   


 


Blood Pressure 138/95  131/90





(mmHg)   


 


O2 Sat by Pulse 99  99





Oximetry   














  10/09/18 10/09/18 10/09/18





  06:30 07:00 07:30


 


Temperature 99.9 F 99.7 F 99.7 F


 


Pulse Rate 109 112 109


 


Respiratory 22 19 19





Rate   


 


Blood Pressure 141/92 134/85 137/87





(mmHg)   


 


O2 Sat by Pulse 99 99 99





Oximetry   














  10/09/18 10/09/18 10/09/18





  08:00 08:30 09:00


 


Temperature 99.9 F 99.9 F 99.9 F


 


Pulse Rate 118 112 116


 


Respiratory 24 23 23





Rate   


 


Blood Pressure  135/88 130/80





(mmHg)   


 


O2 Sat by Pulse 98 99 98





Oximetry   











Oxygen Devices in Use Now: None


Eyes: PERRLA


Ears/Nose/Mouth/Throat: Clear Oropharnyx, - - oral mucosa dry


Neck: NL Appearance and Movements; NL JVP, Trachea Midline, No Thyroid 

Enlargement, Masses


Respiratory: Symmetrical Chest Expansion and Respiratory Effort, Clear to 

Auscultation


Cardiovascular: NL Sounds; No Murmurs; No JVD, RRR


Abdominal: - - + bs soft + ascites 


Extremities: No Edema, - - able to raise ue and le against gravity


Skin: No Rash or Ulcers


Neurological: Alert and Oriented x 3, NL Sensation, NL Muscle Strength and Tone


Result Diagrams: 


 10/09/18 05:10





 10/09/18 09:10


Microbiology and Other Data: 


 Microbiology











 10/09/18 03:10 Nasal Screen MRSA (PCR) - Final





 Nasal    Mrsa Not Detected


 


 10/08/18 21:19 Influenza Types A,B Antigen - Final





 Nasal    Specimen received for Influenza A/B Molecular testing














Assess/Plan/Problems-Billing


Assessment: 





44 yr old wf with chronic etoh dep presented with severe metabolic acidosis pt 

will be taken over by critical care at 7 am case d/w critical care on call too 





- Patient Problems


(1) Alcohol use disorder


Current Visit: No   Status: Acute   Code(s): F10.99 - ALCOHOL USE, UNSP WITH 

UNSPECIFIED ALCOHOL-INDUCED DISORDER   SNOMED Code(s): 22226584


   





(2) Tobacco use disorder


Current Visit: No   Status: Acute   Code(s): F17.200 - NICOTINE DEPENDENCE, 

UNSPECIFIED, UNCOMPLICATED   SNOMED Code(s): 930345916


   Comment: may order nicotine patch as needed    





(3) MDD (major depressive disorder), recurrent episode, severe


Current Visit: No   Status: Acute   Code(s): F33.2 - MAJOR DEPRESSV DISORDER, 

RECURRENT SEVERE W/O PSYCH FEATURES   SNOMED Code(s): 146496547707


   Comment: moniter at this point    





(4) Metabolic acidosis


Current Visit: Yes   Status: Acute   Code(s): E87.2 - ACIDOSIS   SNOMED Code(s)

: [id number]


   Comment: mgt d/w critical care 


ok to use bicarb drip but need to watch her resp rate she is not in resp 

distress that is why this writer placed bicarb drip while monitering 


repeat labs ordered including serum osmo 


she is aao times three despite of sig lab abnormality ---> other sources of 

cause also considered 





total critical care time 70 min    





(5) UTI (urinary tract infection)


Current Visit: Yes   Status: Acute   Comment: levaquin ordered as per er  

rocephin might be a better choice

## 2018-10-10 NOTE — PN
Subjective


Date of Service: 10/10/18


Interval History: 





Pt feels very weak. Ate lunch. Denies abd pain.


Just got out of inpatient rehab 1 month ago


Family History: Findings - etoh from mom


Social History: Findings - chronic etoh dep will drink daily with vodka 750 cc 

since age 30 just went to detox and stayed there 40 days and just came out from 

there. cig 0.5 to 1 ppd no ivda


Past Medical History: Findings - chronic etoh dep cig smoker ascites mood 

disorder with both depression/anxiety ptsd psoriasis hx of carpal tunnel 

surgery s/p neck and lower back surgery





Objective


Active Medications: 








Acetaminophen (Tylenol Tab*)  650 mg PO Q4H PRN


   PRN Reason: PAIN


   Last Admin: 10/10/18 08:10 Dose:  650 mg


Albuterol (Ventolin Hfa Inhaler*)  2 puff INH Q4H PRN


   PRN Reason: WHEEZING


Clonidine HCl (Catapres Tab*)  0.1 mg PO TID [place]


Duloxetine HCl (Cymbalta Cap*)  60 mg PO DAILY [place]


Folic Acid (Folvite Tab*)  1 mg PO DAILY [facility]


   Last Admin: 10/10/18 08:10 Dose:  1 mg


Gabapentin (Neurontin Cap(*))  800 mg PO TID [facility]


Heparin Sodium (Porcine) (Heparin Vial(*))  5,000 units SUBCUT Q12HR [facility]


   Last Admin: 10/10/18 08:12 Dose:  5,000 units


Hydroxyzine HCl (Atarax Tab*)  50 mg PO BID [facility]


Potassium Chloride (Potassium Chloride 20 Meq/100 Ml Ivpremix*)  20 meq in 100 

mls @ 50 mls/hr IV Q2H [place]


   Stop: 10/10/18 14:59


   Last Admin: 10/10/18 11:42 Dose:  50 mls/hr


Potassium Phosphate 15 mmole/ (Sodium Chloride)  255 mls @ 42 mls/hr IVPB ONCE 

ONE


   Stop: 10/10/18 14:28


   Last Admin: 10/10/18 09:46 Dose:  42 mls/hr


Lidocaine (Lidoderm 5% Patch*)  1 patch TRANSDERM DAILY PRN


   PRN Reason: PAIN


Lorazepam (Ativan Tab(*))  0 - 6 mg PO .PER [facility] PROTOCOL [place]; Protocol


Morphine Sulfate (Morphine Vial*)  2 mg IV Q4H PRN


   PRN Reason: PAIN - MILD


Multivitamins/Minerals (Theragran/Minerals Tab*)  1 tab PO DAILY [facility]


   Last Admin: 10/10/18 08:10 Dose:  1 tab


Nicotine (Nicotine Patch 21 Mg/24 Hr*)  1 patch TRANSDERM DAILY@0800 [facility]


Ondansetron HCl (Zofran Inj*)  4 mg IV Q6H PRN


   PRN Reason: NAUSEA


Potassium Phos/Sodium Phos (Neutra Phos 250 Mg Joaquín*)  250 mg PO TID [facility]


   Last Admin: 10/10/18 09:40 Dose:  250 mg


Quetiapine Fumarate (Seroquel Xr Tab*)  300 mg PO BEDTIME [facility]


Thiamine HCl (Vitamin B-1 Tab*)  100 mg PO DAILY [facility]


   Last Admin: 10/10/18 08:10 Dose:  100 mg


Thiamine HCl (Vitamin B-1 Tab*)  100 mg PO DAILY [facility]


   Last Admin: 10/10/18 09:40 Dose:  100 mg


Trazodone HCl (Desyrel Tab*)  100 mg PO BEDTIME [facility]








 Vital Signs - 8 hr











  10/10/18 10/10/18 10/10/18





  05:55 07:15 07:38


 


Temperature 98.5 F  98.7 F


 


Pulse Rate 95  84


 


Respiratory 24 24 22





Rate   


 


Blood Pressure 127/75  124/67





(mmHg)   


 


O2 Sat by Pulse 99  100





Oximetry   














  10/10/18 10/10/18 10/10/18





  08:11 09:39 11:41


 


Temperature   98.6 F


 


Pulse Rate   88


 


Respiratory 22 15 22





Rate   


 


Blood Pressure   125/72





(mmHg)   


 


O2 Sat by Pulse   99





Oximetry   











Oxygen Devices in Use Now: None


Appearance: 43 yo F in nAD, aAOx3


Eyes: No Scleral Icterus, PERRLA


Ears/Nose/Mouth/Throat: NL Teeth, Lips, Gums, Mucous Membranes Moist


Neck: NL Appearance and Movements; NL JVP, Trachea Midline


Respiratory: Symmetrical Chest Expansion and Respiratory Effort, Clear to 

Auscultation


Cardiovascular: NL Sounds; No Murmurs; No JVD, RRR


Abdominal: NL Sounds; No Tenderness; No Distention, No Hepatosplenomegaly


Lymphatic: No Cervical Adenopathy


Extremities: No Edema, No Clubbing, Cyanosis


Skin: No Rash or Ulcers, No Nodules or Sclerosis


Neurological: Alert and Oriented x 3, - - mild tremor inn b/l UE's noted


Result Diagrams: 


 10/10/18 04:44





 10/10/18 11:47


Microbiology and Other Data: 


 Microbiology











 10/09/18 03:10 Nasal Screen MRSA (PCR) - Final





 Nasal    Mrsa Not Detected


 


 10/08/18 21:19 Influenza Types A,B Antigen - Final





 Nasal    Specimen received for Influenza A/B Molecular testing














Assess/Plan/Problems-Billing


Assessment: 





44 yr old wf with chronic etoh dep presented with severe metabolic acidosis 





- Patient Problems


(1) Metabolic acidosis


Comment: likely due to starvation ketosis, resolving. cont IVF


   





(2) Alcohol use disorder


Comment: ivf 


thiamine 


ativan prn    





(3) Hypophosphatemia


Comment: replacing PO and IV   





(4) Hypokalemia


Comment: replacing PO   





(5) DVT prophylaxis


Comment: HSQ   


Status and Disposition: 


inpatient

## 2018-10-10 NOTE — PN
Hospitalist Progress Note


Date of Service: 10/10/18





pt was transferred from icu to hospitalist service last night 


blood cx 1/4 + gram + organism but did not look like staph aureas repeat blood 

cx ordered as per protocol 


bicarb is 14 repeat bmp at noon ordered to f/u the bicarb trend

## 2018-10-11 NOTE — PN
Subjective


Date of Service: 10/11/18


Interval History: 





Pt feels very "weak". Has chronic LBP and had surgery for it. Appetite good. 

Loose stools noted, no abd pain


Family History: Findings - etoh from mom


Social History: Findings - ETOH abuse


Past Medical History: Findings - chronic etoh dep cig smoker ascites mood 

disorder with both depression/anxiety ptsd psoriasis hx of carpal tunnel 

surgery s/p neck and lower back surgery





Objective


Active Medications: 








Acetaminophen (Tylenol Tab*)  650 mg PO Q4H PRN


   PRN Reason: PAIN


   Last Admin: 10/11/18 15:25 Dose:  650 mg


Albuterol (Ventolin Hfa Inhaler*)  2 puff INH Q4H PRN


   PRN Reason: WHEEZING


Duloxetine HCl (Cymbalta Cap*)  [facility]


   Last Admin: 10/11/18 08:33 Dose:  60 mg


Folic Acid (Folvite Tab*)  1 mg PO DAILY [facility]


   Last Admin: 10/11/18 08:34 Dose:  1 mg


Gabapentin (Neurontin Cap(*))  800 mg PO TID [facility]


   Last Admin: 10/11/18 13:35 Dose:  800 mg


Heparin Sodium (Porcine) (Heparin Vial(*))  5,000 units SUBCUT Q12HR [facility]


   Last Admin: 10/11/18 08:32 Dose:  5,000 units


Hydroxyzine HCl (Atarax Tab*)  50 mg PO BID [facility]


   Last Admin: 10/11/18 08:33 Dose:  50 mg


Piperacillin Sod/Tazobactam (Sod 3.375 gm/ Sodium Chloride)  100 mls @ 25 mls/

hr IVPB Q8H [facility]


   Last Admin: 10/11/18 16:36 Dose:  25 mls/hr


Lidocaine (Lidoderm 5% Patch*)  1 patch TRANSDERM DAILY PRN


   PRN Reason: PAIN


   Last Admin: 10/11/18 17:04 Dose:  1 patch


Lorazepam (Ativan Tab(*))  0 - 6 mg PO .PER [facility] PROTOCOL [facility]; Protocol


   Last Admin: 10/10/18 18:33 Dose:  1 mg


Multivitamins/Minerals (Theragran/Minerals Tab*)  1 tab PO DAILY [facility]


   Last Admin: 10/11/18 08:34 Dose:  1 tab


Nicotine (Nicotine Patch 21 Mg/24 Hr*)  1 patch TRANSDERM DAILY@0800 [facility]


   Last Admin: 10/11/18 08:32 Dose:  1 patch


Ondansetron HCl (Zofran Inj*)  4 mg IV Q6H PRN


   PRN Reason: NAUSEA


Oxycodone HCl (Roxycodone Tab*)  5 mg PO Q4H PRN


   PRN Reason: PAIN


   Last Admin: 10/11/18 17:04 Dose:  5 mg


Pharmacy Profile Note (Lidocaine Patch Remove*)  1 note PATCH OFF 0500 ONE


   Stop: 10/12/18 05:01


Potassium Phos/Sodium Phos (Neutra Phos 250 Mg Joaquín*)  250 mg PO TID [facility]


   Last Admin: 10/11/18 13:35 Dose:  250 mg


Quetiapine Fumarate (Seroquel Xr Tab*)  300 mg PO BEDTIME [facility]


   Last Admin: 10/10/18 20:09 Dose:  300 mg


Thiamine HCl (Vitamin B-1 Tab*)  100 mg PO DAILY [facility]


   Last Admin: 10/11/18 08:34 Dose:  100 mg


Trazodone HCl (Desyrel Tab*)  100 mg PO BEDTIME [facility]


   Last Admin: 10/10/18 20:09 Dose:  100 mg








 Vital Signs - 8 hr











  10/11/18 10/11/18 10/11/18





  10:35 11:20 13:35


 


Temperature  98.2 F 


 


Pulse Rate  105 


 


Respiratory 15 18 15





Rate   


 


Blood Pressure  89/56 





(mmHg)   


 


O2 Sat by Pulse  96 





Oximetry   














  10/11/18 10/11/18 10/11/18





  14:56 15:35 16:43


 


Temperature 98.9 F  


 


Pulse Rate 87  91


 


Respiratory 16 18 





Rate   


 


Blood Pressure 106/72  109/74





(mmHg)   


 


O2 Sat by Pulse 96  98





Oximetry   














  10/11/18





  17:04


 


Temperature 


 


Pulse Rate 


 


Respiratory 20





Rate 


 


Blood Pressure 





(mmHg) 


 


O2 Sat by Pulse 





Oximetry 











Oxygen Devices in Use Now: None


Appearance: 43 yo F in nAD, AAOx3


Eyes: No Scleral Icterus, PERRLA


Ears/Nose/Mouth/Throat: NL Teeth, Lips, Gums, Mucous Membranes Moist


Neck: NL Appearance and Movements; NL JVP, Trachea Midline


Respiratory: Symmetrical Chest Expansion and Respiratory Effort, Clear to 

Auscultation


Cardiovascular: NL Sounds; No Murmurs; No JVD, RRR


Abdominal: NL Sounds; No Tenderness; No Distention


Lymphatic: No Cervical Adenopathy


Extremities: No Edema, No Clubbing, Cyanosis


Skin: No Rash or Ulcers, No Nodules or Sclerosis


Neurological: Alert and Oriented x 3, - - b/l LE's at 4/5 -movement limited by 

pain in lower back which is chronic


Result Diagrams: 


 10/10/18 04:44





 10/11/18 04:50


Microbiology and Other Data: 


 Microbiology











 10/09/18 03:10 Nasal Screen MRSA (PCR) - Final





 Nasal    Mrsa Not Detected


 


 10/08/18 21:19 Influenza Types A,B Antigen - Final





 Nasal    Specimen received for Influenza A/B Molecular testing














Assess/Plan/Problems-Billing


Assessment: 





44 yr old wf with chronic etoh dep presented with severe metabolic acidosis 





- Patient Problems


(1) Blood bacterial culture positive


Comment: pt's blood cx from central line positive for Staph hominis and 

acinetobacter baumanii. Blood cx obtained peripherally at admission positive 

for coag neg staph (staph epi).


Cubsided ID: likely contamination. will d/c central line, D/c Zosyn and 

monitor. Pt is nontoxic appearing and has no symptoms of infection   





(2) Metabolic acidosis


Comment: likely due to starvation ketosis, resolved. 


   





(3) Alcohol use disorder


Comment: 


thiamine 


ativan prn    





(4) Hypophosphatemia


Comment: replacing PO and IV   





(5) Hypokalemia


Comment: replacing PO   





(6) Generalized weakness


Comment: due to deconditioning and electrolyte abn.


cont PO/OT, PMRU consult placed   





(7) DVT prophylaxis


Comment: HSQ   


Status and Disposition: 


inpatient

## 2018-10-12 NOTE — PN
Subjective


Date of Service: 10/12/18


Interval History: 





Afebrile since 10/9


Lethargic this AM after ativan at 4am for [facility]. No additional ativan needed 

since that time. 


Family History: Findings - etoh from mom


Social History: Findings - ETOH abuse


Past Medical History: Findings - chronic etoh dep cig smoker ascites mood 

disorder with both depression/anxiety ptsd psoriasis hx of carpal tunnel 

surgery s/p neck and lower back surgery





Objective


Active Medications: 








Acetaminophen (Tylenol Tab*)  650 mg PO Q4H PRN


   PRN Reason: PAIN


   Last Admin: 10/12/18 04:02 Dose:  650 mg


Albuterol (Ventolin Hfa Inhaler*)  2 puff INH Q4H PRN


   PRN Reason: WHEEZING


Duloxetine HCl (Cymbalta Cap*)  60 mg PO DAILY [facility]


   Last Admin: 10/12/18 08:00 Dose:  60 mg


Folic Acid (Folvite Tab*)  1 mg PO DAILY [facility]


   Last Admin: 10/12/18 08:00 Dose:  1 mg


Gabapentin (Neurontin Cap(*))  800 mg PO TID [facility]


   Last Admin: 10/12/18 19:54 Dose:  800 mg


Heparin Sodium (Porcine) (Heparin Vial(*))  5,000 units SUBCUT Q12HR [facility]


   Last Admin: 10/12/18 19:56 Dose:  5,000 units


Hydroxyzine HCl (Atarax Tab*)  50 mg PO BID [facility]


   Last Admin: 10/12/18 19:54 Dose:  50 mg


Lidocaine (Lidoderm 5% Patch*)  1 patch TRANSDERM DAILY PRN


   PRN Reason: PAIN


   Last Admin: 10/11/18 17:04 Dose:  1 patch


Lorazepam (Ativan Tab(*))  0 - 6 mg PO .PER [facility] PROTOCOL [facility]; Protocol


   Last Admin: 10/12/18 04:04 Dose:  1 mg


Multivitamins/Minerals (Theragran/Minerals Tab*)  1 tab PO DAILY [facility]


   Last Admin: 10/12/18 08:00 Dose:  1 tab


Nicotine (Nicotine Patch 21 Mg/24 Hr*)  1 patch TRANSDERM DAILY@0800 [facility]


   Last Admin: 10/12/18 08:00 Dose:  1 patch


Ondansetron HCl (Zofran Inj*)  4 mg IV Q6H PRN


   PRN Reason: NAUSEA


Oxycodone HCl (Roxycodone Tab*)  5 mg PO Q4H PRN


   PRN Reason: PAIN


   Last Admin: 10/12/18 19:54 Dose:  5 mg


Potassium Phos/Sodium Phos (Neutra Phos 250 Mg Joaquín*)  250 mg PO TID [facility]


   Last Admin: 10/12/18 19:53 Dose:  250 mg


Quetiapine Fumarate (Seroquel Xr Tab*)  300 mg PO BEDTIME [facility]


   Last Admin: 10/12/18 19:55 Dose:  300 mg


Thiamine HCl (Vitamin B-1 Tab*)  100 mg PO DAILY [facility]


   Last Admin: 10/12/18 08:00 Dose:  100 mg


Trazodone HCl (Desyrel Tab*)  100 mg PO BEDTIME [facility]


   Last Admin: 10/12/18 19:55 Dose:  100 mg








 Vital Signs - 8 hr











  10/12/18 10/12/18 10/12/18





  13:24 13:42 15:18


 


Temperature   97.5 F


 


Pulse Rate 116  99


 


Respiratory  16 16





Rate   


 


Blood Pressure 97/58  104/65





(mmHg)   


 


O2 Sat by Pulse 97  98





Oximetry   














  10/12/18 10/12/18 10/12/18





  15:50 16:15 17:17


 


Temperature   98.2 F


 


Pulse Rate   102


 


Respiratory 18 18 16





Rate   


 


Blood Pressure   111/65





(mmHg)   


 


O2 Sat by Pulse   97





Oximetry   














  10/12/18 10/12/18





  18:22 19:54


 


Temperature  


 


Pulse Rate  


 


Respiratory 18 20





Rate  


 


Blood Pressure  





(mmHg)  


 


O2 Sat by Pulse  





Oximetry  











Oxygen Devices in Use Now: None


Appearance: NAD


Eyes: No Scleral Icterus, PERRLA


Ears/Nose/Mouth/Throat: NL Teeth, Lips, Gums, Clear Oropharnyx, Mucous 

Membranes Moist


Neck: NL Appearance and Movements; NL JVP, Trachea Midline


Respiratory: Symmetrical Chest Expansion and Respiratory Effort, Clear to 

Auscultation


Cardiovascular: NL Sounds; No Murmurs; No JVD, RRR


Abdominal: NL Sounds; No Tenderness; No Distention, No Hepatosplenomegaly


Lymphatic: No Cervical Adenopathy


Extremities: No Edema


Skin: - - right neck c/d/i at site of previous TLC


Neurological: Alert and Oriented x 3


Result Diagrams: 


 10/12/18 06:47





 10/12/18 06:47


Microbiology and Other Data: 


 Microbiology











 10/09/18 03:10 Nasal Screen MRSA (PCR) - Final





 Nasal    Mrsa Not Detected


 


 10/08/18 21:19 Influenza Types A,B Antigen - Final





 Nasal    Specimen received for Influenza A/B Molecular testing














Assess/Plan/Problems-Billing


Assessment: 





44 yr old wf with chronic etoh dep presented with severe metabolic acidosis 





- Patient Problems


(1) Blood bacterial culture positive


Current Visit: Yes   Status: Acute   Code(s): R78.81 - BACTEREMIA   SNOMED Code(

s): 655398177


   Comment: pt's blood cx from central line positive for Staph hominis and 

acinetobacter baumanii. Blood cx obtained peripherally at admission positive 

for coag neg staph (staph epi).


Per Dr. Archer discussion with ID: likely contamination. 


abx'ed stopped and and monitor. No additional fevers, improving overall   





(2) Generalized weakness


Current Visit: Yes   Status: Acute   Code(s): R53.1 - WEAKNESS   SNOMED Code(s)

: 54561078


   Comment: due to deconditioning and electrolyte abn.


cont PO/OT, PMRU declined   





(3) Hypokalemia


Current Visit: Yes   Status: Acute   Code(s): E87.6 - HYPOKALEMIA   SNOMED Code(

s): 63698216


   Comment: replacing PO   





(4) Hypophosphatemia


Current Visit: Yes   Status: Acute   Code(s): E83.39 - OTHER DISORDERS OF 

PHOSPHORUS METABOLISM   SNOMED Code(s): 2037566


   Comment: improved


neutraphos


trend


   





(5) Metabolic acidosis


Current Visit: Yes   Status: Acute   Code(s): E87.2 - ACIDOSIS   SNOMED Code(s)

: 69862737


   Comment: likely due to starvation ketosis, resolved. 


   





(6) DVT prophylaxis


Current Visit: Yes   Status: Acute   Code(s): SKH8178 -    SNOMED Code(s): 

789310122


   Comment: HSQ   


Status and Disposition: 


inpatient

## 2018-10-13 NOTE — PN
Subjective


Date of Service: 10/13/18


Interval History: 





Feels better compared to yesterday but appears fatigued (also after receipt of 

ativan)


Has no other complaints 


Family History: Findings - etoh from mom


Social History: Findings - ETOH abuse


Past Medical History: Findings - chronic etoh dep cig smoker ascites mood 

disorder with both depression/anxiety ptsd psoriasis hx of carpal tunnel 

surgery s/p neck and lower back surgery





Objective


Active Medications: 








Acetaminophen (Tylenol Tab*)  650 mg PO Q4H PRN


   PRN Reason: PAIN


   Last Admin: 10/12/18 22:31 Dose:  650 mg


Albuterol (Ventolin Hfa Inhaler*)  2 puff INH Q4H PRN


   PRN Reason: WHEEZING


Duloxetine HCl (Cymbalta Cap*)  60 mg PO DAILY [facility]


   Last Admin: 10/13/18 08:03 Dose:  60 mg


Folic Acid (Folvite Tab*)  1 mg PO DAILY [facility]


   Last Admin: 10/13/18 08:03 Dose:  1 mg


Gabapentin (Neurontin Cap(*))  800 mg PO TID [facility]


   Last Admin: 10/13/18 13:17 Dose:  800 mg


Heparin Sodium (Porcine) (Heparin Vial(*))  5,000 units SUBCUT Q12HR [facility]


   Last Admin: 10/13/18 07:56 Dose:  5,000 units


Lidocaine (Lidoderm 5% Patch*)  1 patch TRANSDERM DAILY PRN


   PRN Reason: PAIN


   Last Admin: 10/11/18 17:04 Dose:  1 patch


Lorazepam (Ativan Tab(*))  0 - 6 mg PO .PER [facility] PROTOCOL [place]; Protocol


Multivitamins/Minerals (Theragran/Minerals Tab*)  1 tab PO DAILY [facility]


   Last Admin: 10/13/18 08:03 Dose:  1 tab


Nicotine (Nicotine Patch 21 Mg/24 Hr*)  1 patch TRANSDERM DAILY@0800 [facility]


   Last Admin: 10/13/18 07:58 Dose:  1 patch


Ondansetron HCl (Zofran Inj*)  4 mg IV Q6H PRN


   PRN Reason: NAUSEA


Oxycodone HCl (Roxycodone Tab*)  5 mg PO Q4H PRN


   PRN Reason: PAIN


   Last Admin: 10/13/18 13:16 Dose:  5 mg


Quetiapine Fumarate (Seroquel Xr Tab*)  300 mg PO BEDTIME [facility]


   Last Admin: 10/12/18 19:55 Dose:  300 mg


Thiamine HCl (Vitamin B-1 Tab*)  100 mg PO DAILY [facility]


   Last Admin: 10/13/18 08:03 Dose:  100 mg


Trazodone HCl (Desyrel Tab*)  100 mg PO BEDTIME [facility]


   Last Admin: 10/12/18 19:55 Dose:  100 mg








 Vital Signs - 8 hr











  10/13/18 10/13/18 10/13/18





  09:01 10:38 10:59


 


Temperature 98.4 F  98.4 F


 


Pulse Rate 105  109


 


Respiratory 18 22 18





Rate   


 


Blood Pressure 102/65  107/55





(mmHg)   


 


O2 Sat by Pulse 98  97





Oximetry   














  10/13/18 10/13/18 10/13/18





  13:06 13:16 13:17


 


Temperature 97.8 F  


 


Pulse Rate 100  


 


Respiratory 16 20 18





Rate   


 


Blood Pressure 117/69  





(mmHg)   


 


O2 Sat by Pulse 95  





Oximetry   














  10/13/18 10/13/18 10/13/18





  15:34 15:41 15:42


 


Temperature 98.2 F  


 


Pulse Rate 106  


 


Respiratory 14 18 18





Rate   


 


Blood Pressure 109/65  





(mmHg)   


 


O2 Sat by Pulse 95  





Oximetry   











Oxygen Devices in Use Now: None


Appearance: lying flat, falls asleep during conversation briefly


Ears/Nose/Mouth/Throat: NL Teeth, Lips, Gums, Clear Oropharnyx


Neck: NL Appearance and Movements; NL JVP, Trachea Midline


Respiratory: Symmetrical Chest Expansion and Respiratory Effort, Clear to 

Auscultation


Cardiovascular: RRR


Abdominal: NL Sounds; No Tenderness; No Distention, No Hepatosplenomegaly


Lymphatic: No Cervical Adenopathy


Extremities: No Edema


Skin: No Rash or Ulcers


Neurological: Alert and Oriented x 3


Result Diagrams: 


 10/13/18 05:36





 10/13/18 05:36


Microbiology and Other Data: 


 Microbiology











 10/09/18 03:10 Nasal Screen MRSA (PCR) - Final





 Nasal    Mrsa Not Detected


 


 10/08/18 21:19 Influenza Types A,B Antigen - Final





 Nasal    Specimen received for Influenza A/B Molecular testing














Assess/Plan/Problems-Billing


Assessment: 





44 yr old wf with chronic etoh dep presented with severe metabolic acidosis 





- Patient Problems


(1) Alcohol abuse


Comment: minimal need for ativan


decrease ativan dosing but maintain WAM   





(2) Blood bacterial culture positive


Current Visit: Yes   Status: Acute   Code(s): R78.81 - BACTEREMIA   SNOMED Code(

s): 066256859


   Comment: pt's blood cx from central line positive for Staph hominis and 

acinetobacter baumanii. Blood cx obtained peripherally at admission positive 

for coag neg staph (staph epi).


Repeat peripheral bcx NGTD


Per Dr. Archer discussion with ID: likely contamination. 


abx'ed stopped and and monitor. No additional fevers, improving overall but has 

developed pancytopenia   





(3) Generalized weakness


Comment: due to deconditioning and electrolyte abn.


cont PO/OT, PMRU declined   





(4) Hypokalemia


Comment: replacing PO PRN   





(5) Hypophosphatemia


Comment: improved


neutraphos stopped 10/13





   





(6) Metabolic acidosis


Comment: likely due to starvation ketosis, resolved. 


   





(7) Pancytopenia


Comment: Associated with neutropenia 


chronic alcoholism, dietary deficiencies seem likely but has developed 

progressively during this hospital stay 


Check retic count, B12, LDH, 


repeat CBC


stop atarax


heme consult if does not resolve   





(8) DVT prophylaxis


Comment: HSQ   


Status and Disposition: 


inpatient

## 2018-10-14 NOTE — PN
Subjective


Date of Service: 10/14/18


Interval History: 





Feels "full" with abdominal distention


No BM in days


Hungry but doesn't as though she can eat 


Less lethargic today 


No anxiety


Family History: Findings - etoh from mom


Social History: Findings - ETOH abuse


Past Medical History: Findings - chronic etoh dep cig smoker ascites mood 

disorder with both depression/anxiety ptsd psoriasis hx of carpal tunnel 

surgery s/p neck and lower back surgery





Objective


Active Medications: 








Acetaminophen (Tylenol Tab*)  650 mg PO Q4H PRN


   PRN Reason: PAIN


   Last Admin: 10/14/18 13:16 Dose:  650 mg


Albuterol (Ventolin Hfa Inhaler*)  2 puff INH Q4H PRN


   PRN Reason: WHEEZING


Duloxetine HCl (Cymbalta Cap*)  60 mg PO DAILY [facility]


   Last Admin: 10/14/18 07:19 Dose:  60 mg


Folic Acid (Folvite Tab*)  1 mg PO DAILY [facility]


   Last Admin: 10/14/18 07:20 Dose:  1 mg


Gabapentin (Neurontin Cap(*))  800 mg PO TID [facility]


   Last Admin: 10/14/18 13:16 Dose:  800 mg


Heparin Sodium (Porcine) (Heparin Vial(*))  5,000 units SUBCUT Q12HR [facility]


   Last Admin: 10/14/18 08:37 Dose:  5,000 units


Sodium Chloride (Ns 0.9% 1000 Ml*)  1,000 mls @ 150 mls/hr IV PER RATE [facility]


   Stop: 10/14/18 17:39


   Last Admin: 10/14/18 11:11 Dose:  150 mls/hr


Lidocaine (Lidoderm 5% Patch*)  1 patch TRANSDERM DAILY PRN


   PRN Reason: PAIN


   Last Admin: 10/11/18 17:04 Dose:  1 patch


Multivitamins/Minerals (Theragran/Minerals Tab*)  1 tab PO DAILY [facility]


   Last Admin: 10/14/18 07:19 Dose:  1 tab


Nicotine (Nicotine Patch 21 Mg/24 Hr*)  1 patch TRANSDERM DAILY@0800 [facility]


   Last Admin: 10/14/18 07:20 Dose:  1 patch


Ondansetron HCl (Zofran Inj*)  4 mg IV Q6H PRN


   PRN Reason: NAUSEA


Oxycodone HCl (Roxycodone Tab*)  5 mg PO Q4H PRN


   PRN Reason: PAIN


   Last Admin: 10/14/18 03:12 Dose:  5 mg


Quetiapine Fumarate (Seroquel Xr Tab*)  300 mg PO BEDTIME [facility]


   Last Admin: 10/13/18 20:53 Dose:  300 mg


Thiamine HCl (Vitamin B-1 Tab*)  100 mg PO DAILY [facility]


   Last Admin: 10/14/18 07:19 Dose:  100 mg


Trazodone HCl (Desyrel Tab*)  100 mg PO BEDTIME [facility]


   Last Admin: 10/13/18 20:53 Dose:  100 mg








 Vital Signs - 8 hr











  10/14/18 10/14/18 10/14/18





  08:36 09:35 11:11


 


Temperature  98.0 F 


 


Pulse Rate  98 


 


Respiratory 18 14 15





Rate   


 


Blood Pressure  107/66 





(mmHg)   


 


O2 Sat by Pulse  99 





Oximetry   














  10/14/18 10/14/18





  11:14 13:16


 


Temperature 98.1 F 


 


Pulse Rate 93 


 


Respiratory 14 20





Rate  


 


Blood Pressure 109/68 





(mmHg)  


 


O2 Sat by Pulse 95 





Oximetry  











Oxygen Devices in Use Now: None


Appearance: unkempt, older than stated age, NAD


Eyes: No Scleral Icterus, PERRLA


Ears/Nose/Mouth/Throat: NL Teeth, Lips, Gums, Clear Oropharnyx


Neck: NL Appearance and Movements; NL JVP, Trachea Midline


Respiratory: Symmetrical Chest Expansion and Respiratory Effort, Clear to 

Auscultation


Cardiovascular: RRR


Abdominal: NL Sounds; No Tenderness; No Distention, No Hepatosplenomegaly


Lymphatic: No Cervical Adenopathy


Extremities: No Edema


Skin: No Rash or Ulcers, - - right neck CVC site c/d/i


Neurological: Alert and Oriented x 3


Result Diagrams: 


 10/14/18 05:00





 10/14/18 05:03


Microbiology and Other Data: 


 Microbiology











 10/09/18 03:10 Nasal Screen MRSA (PCR) - Final





 Nasal    Mrsa Not Detected


 


 10/08/18 21:19 Influenza Types A,B Antigen - Final





 Nasal    Specimen received for Influenza A/B Molecular testing














Assess/Plan/Problems-Billing


Assessment: 





44 yr old wf with chronic etoh dep presented with severe metabolic acidosis 





- Patient Problems


(1) Alcohol abuse


Comment: minimal need for ativan


WAM discontinued 10/14   





(2) Blood bacterial culture positive


Current Visit: Yes   Status: Acute   Code(s): R78.81 - BACTEREMIA   SNOMED Code(

s): 411241036


   Comment: pt's blood cx from central line positive for Staph hominis and 

acinetobacter baumanii. Blood cx obtained peripherally at admission positive 

for coag neg staph (staph epi).


Repeat peripheral bcx NGTD


Per Dr. Archer discussion with ID: likely contamination. 


abx'ed stopped and and monitor. No additional fevers, improving overall but has 

developed pancytopenia


If neutropenia does not resolve, fevers develop, or tachycardia does not 

resolve can consider Abx   





(3) Generalized weakness


Comment: due to deconditioning and electrolyte abn.


cont PO/OT, PMRU declined


Possibly [place]   





(4) Hypokalemia


Comment: replacing PO PRN   





(5) Hypophosphatemia


Comment: improved


neutraphos stopped 10/13





   





(6) Metabolic acidosis


Comment: likely due to starvation ketosis, resolved. 


   





(7) Pancytopenia


Comment: Associated with neutropenia 


chronic alcoholism, dietary deficiencies seem likely but has developed 

progressively during this hospital stay 


Checked retic count, B12, LDH, 


Add on methylmalonic acid for 10/15


repeat CBC


stop atarax


heme consult if does not resolve   





(8) DVT prophylaxis


Comment: HSQ   


Status and Disposition: 


inpatient

## 2018-10-14 NOTE — RAD
Indication: Abdominal distention.



Flat and upright views of the abdomen demonstrates stool throughout the colon. No dilated

loops of bowel are noted. Fecal stasis is noted. Psoas margins and organ silhouettes are

unremarkable.



IMPRESSION: Fecal stasis. No evidence of bowel obstruction is noted.

## 2018-10-15 NOTE — DS
CC:  [facility]. *

 

DISCHARGE SUMMARY:

 

DATE OF ADMISSION:  10/09/18

 

DATE OF DISCHARGE:  10/15/18

 

The patient is being transferred to [facility] for rehab.

 

PRIMARY CARE PROVIDER:  None.

 

DISCHARGE DIAGNOSES:

1.  Dehydration and starvation ketosis.

2.  History of alcohol abuse.

3.  Multiple metabolic derangement including hypomagnesemia, hypokalemia, and 
hypophosphatemia.

4.  Pancytopenia this is resolving likely due to refeeding.

5.  Positive blood cultures, likely due to contamination.

 

SECONDARY DIAGNOSES:

1.  Alcohol abuse.

2.  Depression.

3.  History of carpal tunnel surgery.

4.  Lower back surgery in the past with chronic back pain.

5.  Posttraumatic stress disorder.

6.  Psoriasis.

 

MEDICATIONS AT DISCHARGE:

1.  Lidoderm patch 5% applied to lower back, daily.

2.  Mag-Ox 800 mg daily.

3.  Acetaminophen on a p.r.n. basis.

4.  Albuterol inhaler on a p.r.n. basis.

5.  Vitamin B12 of 1000 mcg daily.

6.  Colace 200 mg daily.

7.  Duloxetine 60 mg daily.

8.  Folic acid 1 mg daily.

9.  Gabapentin 800 mg 3 times a day.

10.  Multivitamin 1 tablet daily.

11.  Nicotine patch 21 mg per 24 hours apply daily.

12.  MiraLAX 17 g daily.

13.  Seroquel  mg at bedtime.

14.  Senokot 2 tablets at bedtime.

15.  Thiamine 100 mg daily.

16.  Trazodone 100 mg at bedtime.

 

LABORATORY DATA AND STUDIES PERFORMED DURING THE HOSPITAL STAY:  Include:

 

Abdominal x-ray obtained on 10/13/18.  Impression: "Fecal stasis.  No evidence 
of bowel obstruction is noted."

 

CT of the chest, abdomen, and pelvis obtained on 10/08/18.  Impression: "There 
is a patchy opacity at the lung basis suspicious for pneumonitis and/or 
atelectasis".

 

HOSPITALIZATION COURSE:  Mrs. Herbert is a 44-year-old female with a history of 
alcoholism who was just discharged from Inpatient Rehab Facility at [facility] a month prior to her current presentation to the ED with complaints of 
nausea, vomiting, and chills for 3 days and generalized weakness.  When the 
patient came into the hospital, she was noted to have a carbon dioxide level of 
below 7.  Her ABG showed pH of below 7 and PCO2 below 20 and bicarb of 1.6.  
The patient was noted to have severe metabolic acidosis, likely due to 
starvation ketosis.  Her alcohol level on admission was 133.  Patient was 
admitted to the intensive care unit and treated with several doses of 
bicarbonate as well as IV fluids.  The patient had also hypopotassemia, 
hypomagnesemia, and hypokalemia that was replaced throughout her hospital stay.
  She had a triple lumen placed in her right jugular vein that was discontinued 
few days prior to her discharge.  She did well during the hospital stay.  She 
was placed on Ativan withdrawal protocol, required several days of the 
treatment without any major delirium noted apart from some of the alcohol 
withdrawal tremors.  Nevertheless, she was markedly deconditioned and required 
rehabilitation at discharge.  She was evaluated by physical therapy and deemed 
to be a good candidate for inpatient rehab placement.  A couple of days prior 
to discharge, she was noted to have severe constipation and she had a bowel 
movement a day prior to discharge, although the abdominal x-rays still showed 
fecal stasis.  She is going to be placed on multiple laxatives to go to rehab 
with.

 

She was noted to have blood cultures positive at admission that showed 
Staphylococcus epidermidis that was deemed to be likely contamination.  On the 
day when the blood cultures were reported to be negative on 10/10/18, the 
patient had another set of blood cultures obtained from the triple lumen 
catheter that was positive for Acinetobacter baumannii, Staphylococcus hominis, 
and Staphylococcus epidermidis.  On the basis of the poly-bacterial blood 
cultures, the case was discussed with infectious disease specialist, who 
thought it was likely related to contamination.  The patient was taken off 
antibiotics approximately 4 days prior to discharge and did well, was nontoxic 
appearing, and afebrile throughout the remaining of her hospital stay without 
any indication of ongoing infection.  She did become briefly pancytopenic 
during the hospital stay likely related to refeeding that was resolving by the 
time of discharge.  She was noted to have anemia during her hospital stay, 
which was likely chronic anemia that was not noted at admission due to her 
severe dehydration.  Partially, her anemia was due to hemodilution, partially 
likely due to malnutrition and alcoholism.  Her vitamin B12 level were on the 
lower side of the norm and she was placed on vitamin B12 supplements by the 
time of discharge.  She is recommended to have a repeat CBC in approximately a 
week after discharge.

 

LABORATORY DATA:  Performed during the hospital stay:  On 10/15/18:  White 
blood cell count of 3.7, hemoglobin 10.0, hematocrit of 27, MCV of 99, and 
platelets of 283.  Sodium of 139, potassium of 4.0, chloride 100, carbon 
dioxide 29, BUN 15, and creatinine of 0.68.  Calcium of 8.8, magnesium of 1.9, 
phosphorus was 4.8 on 10/13/18.

 

PHYSICAL EXAMINATION:  At the time of discharge blood pressure of 96/54, heart 
rate of 102 and regular, respiratory rate 18, oxygen saturation 95% on room air
, and temperature of 97.7.

 

General Appearance:  This is a very pleasant 44-year-old female who is in no 
acute distress.  Alert, awake, and oriented x3.

 

HEENT:  Head atraumatic and normocephalic.  Eyes:  Pupils are equal, round, and 
reactive to light and accommodation.  Oropharynx clear.  Mucosa moist.

 

Neck:  Supple.  No JVD.  No bruits bilaterally.

 

Cardiovascular:  Regular, rate, and rhythm.  No murmur.

 

Respiratory:  Crackles at the left lower base, otherwise clear.

 

Abdomen:  Slightly distended, soft, and nontender.  Bowel sounds are present in 
all 4 quadrants.

 

Extremities:  There is +1 pitting pedal edema bilaterally.  Pulses +2 
bilaterally. There is no clubbing or cyanosis.

 

Neuro Evaluation:  Speech clear.  Cranial nerves II through XII grossly intact. 
Motor strength is 5/5 bilaterally.  No tremors noted.

 

On evaluation of the skin, no ecchymotic areas or rashes noted.  The puncture 
site on the right side of the neck from the IJ catheter placement is covered 
with eschar with no evidence of cellulitis.

 

Psychiatric Evaluation:  Oriented x3 with no evidence of anxiety or depression.

 

The patient is being transferred to [facility] for continuation of 
rehab.

 

Please note that this is a short summary of the patient's hospital stay.  
Please refer to further medical records for details.

 

TIME SPENT:  Approximately 45 minutes was spent on the patient's discharge.

 

 [id number]/CPS #: [id number]

MTDD

## 2019-01-28 NOTE — ED
Substance Abuse/Use





- HPI Summary


HPI Summary: 


A 44 y/o female brought in by ambulance presents to the ED c/o alcoholism. In 

the ED room, the patient has a pulse of 100 BPM and O2 saturation of 96%. As 

per EMS, the patient is a chronic alcoholic. This episode started Friday night 

as she has been drinking 1 - 1.5 L of Vodka per day. Patient has also not been 

taking her medications. Patient has a PMHx of alcoholism, depression, bipolar, 

and anxiety. Patient is suppose to see Dr. Schmitz at 11 AM for a "nerve thing" 

as she has suppose to have neck and back surgery soon. Patient noted that she 

has up and down mood swings, has not taken her medications, eaten any real 

food. Family called EMS due to her constant drinking. Patient had vodka at 0930 

this morning. Patient denies any SOB and CP. Patient is allergic to Lyrica.











- History Of Current Complaint


Chief Complaint: EDSubstanceAbuse


Stated Complaint: ETOH/MHE


Time Seen by Provider: 19 10:35


Hx Obtained From: Patient


Onset/Duration  of Drug/ETOH Abuse: Years - chronic


Ingestion History: Type/Name Of Drug - etoh, vodka


Overdose Characteristics: Oral


Timing Of Abuse: Daily


Severity Currently: None


Aggravating Factor(s): Nothing


Alleviating Factor(s): Nothing


Associated Signs And Symptoms: Negative





- Allergies/Home Medications


Allergies/Adverse Reactions: 


 Allergies











Allergy/AdvReac Type Severity Reaction Status Date / Time


 


Adhesive Tape [Paper Tape] Allergy  Itching Verified 19 10:43


 


pregabalin [From Lyrica] Allergy  Unknown Verified 19 10:43





   Reaction  





   Details  











Home Medications: 


 Home Medications





DULoxetine DR CAP* [Cymbalta CAP*] 30 mg PO DAILY 19 [History Confirmed ]


DULoxetine DR CAP* [Cymbalta CAP*] 60 mg PO DAILY 19 [History Confirmed ]


clonazePAM TAB(*) [KlonoPIN TAB(*)] 0.5 mg PO DAILY PRN 19 [History 

Confirmed 19]


traZODone TAB* [Desyrel TAB*] 100 mg PO BEDTIME 19 [History Confirmed ]











PMH/Surg Hx/FS Hx/Imm Hx


Endocrine/Hematology History: 


   Denies: Hx Diabetes


Cardiovascular History: Reports: Hx Hypertension


   Denies: Hx Pacemaker/ICD


Respiratory History: Reports: Hx Asthma


GI History: Reports: Other GI Disorders - hx of fatty liver disease


Musculoskeletal History: Reports: Hx Back Problems, Other Musculoskeletal 

History - hx of degenerative disc disease


Sensory History: Reports: Hx Contacts or Glasses - pt states "supposed to wear 

glasses but don't use them"


   Denies: Hx Hearing Aid


Opthamlomology History: Reports: Hx Contacts or Glasses - pt states "supposed 

to wear glasses but don't use them"


Neurological History: Reports: Hx Seizures - d/t etoh withdrawal 7 years ago


Psychiatric History: Reports: Hx Anxiety, Hx Depression, Hx Panic Disorder - 

ANXIETY, Hx Inpatient Treatment - following partner's suicide in , 

Hx Substance Abuse


   Denies: Hx Eating Disorder, Hx of Violent Episodes Against Others





- Surgical History


Surgery Procedure, Year, and Place:  & [facility] SURGERY X2.  





- Immunization History


Date of Tetanus Vaccine: utd


Date of Influenza Vaccine: none


Infectious Disease History: Yes


Infectious Disease History: 


   Denies: Hx of Known/Suspected MRSA, Traveled Outside the [place] in Last 30 Days





- Family History


Known Family History: Positive: Other


   Negative: Diabetes


Family History: alcoholism, SI





- Social History


Alcohol Use: Daily


Alcohol Amount: 1/5 a week


Hx Substance Use: No


Substance Use Type: Reports: Marijuana


Substance Use Comment - Amount & Last Used: occasionally


Hx Tobacco Use: Yes


Smoking Status (MU): Heavy Every Day Tobacco Smoker


Type: Cigarettes


Amount Used/How Often: states she smokes 1 PPD


Have You Smoked in the Last Year: Yes





Review of Systems


Positive: Other - POSITIVE: ALCOHOLISM..  Negative: Fever


Negative: Chest Pain


Negative: Shortness Of Breath


All Other Systems Reviewed And Are Negative: Yes





Physical Exam





- Summary


Physical Exam Summary: 


VITAL SIGNS: Reviewed.


GENERAL: Patient is an intoxicated female who is lying comfortable in the 

stretcher. Patient is not in any acute respiratory distress. 


HEAD AND FACE: No signs of trauma. No ecchymosis, hematomas or skull 

depressions. No sinus tenderness.


EYES: PERRLA, EOMI x 2, No injected conjunctiva, no nystagmus.


EARS: Hearing grossly intact. Ear canals and tympanic membranes are within 

normal limits.


MOUTH: Oropharynx within normal limits. Patient has alcohol in breath. 


NECK: Supple, trachea is midline, no adenopathy, no JVD, no carotid bruit, no c-

spine tenderness, neck with full ROM.


CHEST: Symmetric, no tenderness at palpation


LUNGS: Clear to auscultation bilaterally. No wheezing or crackles.


CVS: Regular rate and rhythm, S1 and S2 present, no murmurs or gallops 

appreciated.


ABDOMEN: Soft, non-tender. No signs of distention. No rebound no guarding, and 

no masses palpated. Bowel sounds are normal.


EXTREMITIES: FROM in all major joints, no edema, no cyanosis or clubbing.


NEURO: Alert and oriented x 3. No acute neurological deficits. Speech is normal 

and follows commands.


SKIN: Dry and warm





Triage Information Reviewed: Yes


Vital Signs On Initial Exam: 


 Initial Vitals











Temp Pulse Resp BP Pulse Ox


 


 98.0 F   100   18   127/84   98 


 


 19 10:35  19 10:35  19 10:35  19 10:35  19 10:35











Vital Signs Reviewed: Yes





Diagnostics





- Vital Signs


 Vital Signs











  Temp Pulse Resp BP Pulse Ox


 


 19 10:35  98.0 F  100  18  127/84  98














- Laboratory


Result Diagrams: 


 19 10:53





 19 10:53


Lab Statement: Any lab studies that have been ordered have been reviewed, and 

results considered in the medical decision making process.





Course/Dx





- Course


Assessment/Plan: A 44 y/o female brought in by ambulance presents to the ED c/o 

alcoholism. In the ED room, the patient has a pulse of 100 BPM and O2 

saturation of 96%. As per EMS, the patient is a chronic alcoholic. This episode 

started Friday night as she has been drinking 1 - 1.5 L of Vodka per day. 

Patient has also not been taking her medications. Patient has a PMHx of 

alcoholism, depression, bipolar, and anxiety. Patient is suppose to see Dr. Schmitz at 11 AM for a "nerve thing" as she has suppose to have neck and back 

surgery soon. Patient noted that she has up and down mood swings, has not taken 

her medications, eaten any real food. Family called EMS due to her constant 

drinking. Patient had vodka at 0930 this morning. Patient denies any SOB and 

CP. Patient is allergic to Lyrica.  Test results without any significant 

abnormality except for carbon dioxide of 18, anion gap of 19, AST is 56.  

Urinalysis is negative for UTI. Serum alcohol is 353.  Therefore the patient is 

with alcohol intoxication.  In the ED course the patient was given IV fluids 

and she was given the banana bag.  7 hours later the patient more alert however 

the patient still intoxicated.  The patients boyfriend came to take the 

patient home.  Since the patients boyfriend is alert and oriented 3 and is 

not under the influence he will be taking care of the patient.  Therefore the 

patient will be discharged home with follow-up with PCP.  I discussed all the 

findings and test results with the patient. Patient was instructed to return to 

the emergency room immediately if any of the symptoms return or worsens. Plan 

of care was discussed with the patient and understands and agrees. All 

questions were answered at patient satisfaction.  There were no further 

complaints or concerns. Lung exam before discharge: CTA B/L. Good air exchange. 

No wheezing or crackles heard. CVS: S1 and S2 present. No murmurs appreciated. 

Patient is alert and oriented x 3. Patient is hemodynamically stable. Patient 

will be discharged home with follow up PCP in the next 2-3 days





- Diagnoses


Provider Diagnoses: 


 Alcohol intoxication








Discharge





- Sign-Out/Discharge


Documenting (check all that apply): Patient Departure - DISCHARGE





- Discharge Plan


Condition: Stable


Disposition: HOME


Patient Education Materials:  Alcohol Intoxication (ED), Abuse of Alcohol (ED)


Referrals: 


[facility] [Outside] - 3 Days


[facility] [place] PHYSICIAN REFERRAL [Outside] - 3 Days


Additional Instructions: 


FOLLOW UP WITH PRIMARY CARE PROVIDER IN 3 DAYS.





RETURN TO ED FOR ANY NEW OR WORSENING SYMPTOMS.





- Billing Disposition and Condition


Condition: STABLE


Disposition: Home





- Attestation Statements


Document Initiated by Jean Marie: Yes


Documenting Scribe: Benjie Olivas


Provider For Whom Jean Marie is Documenting (Include Credential): Ryan Turcios MD


Scribe Attestation: 


Benjie WALSH scribed for Ryan Turcios MD on 19 at 1844. 


Scribe Documentation Reviewed: Yes


Provider Attestation: 


The documentation as recorded by the Benjie jolly accurately reflects 

the service I personally performed and the decisions made by me, Ryan Turcios MD


Status of Scribe Document: Viewed





Attestations


Scribe Attestation: 


Benjie Olivas


User Type: Provider